# Patient Record
Sex: MALE | Race: WHITE | NOT HISPANIC OR LATINO | Employment: FULL TIME | URBAN - METROPOLITAN AREA
[De-identification: names, ages, dates, MRNs, and addresses within clinical notes are randomized per-mention and may not be internally consistent; named-entity substitution may affect disease eponyms.]

---

## 2017-06-19 ENCOUNTER — OFFICE VISIT (OUTPATIENT)
Dept: PEDIATRICS | Facility: CLINIC | Age: 62
End: 2017-06-19
Payer: COMMERCIAL

## 2017-06-19 VITALS
DIASTOLIC BLOOD PRESSURE: 80 MMHG | BODY MASS INDEX: 30.08 KG/M2 | HEIGHT: 68 IN | HEART RATE: 71 BPM | TEMPERATURE: 96 F | OXYGEN SATURATION: 96 % | WEIGHT: 198.5 LBS | SYSTOLIC BLOOD PRESSURE: 122 MMHG

## 2017-06-19 DIAGNOSIS — J20.9 ACUTE BRONCHITIS, UNSPECIFIED ORGANISM: Primary | ICD-10-CM

## 2017-06-19 PROCEDURE — 99213 OFFICE O/P EST LOW 20 MIN: CPT | Performed by: NURSE PRACTITIONER

## 2017-06-19 RX ORDER — ALBUTEROL SULFATE 90 UG/1
2 AEROSOL, METERED RESPIRATORY (INHALATION) EVERY 4 HOURS PRN
Qty: 1 INHALER | Refills: 1 | Status: SHIPPED | OUTPATIENT
Start: 2017-06-19 | End: 2017-12-26

## 2017-06-19 RX ORDER — METHYLPREDNISOLONE 4 MG
TABLET, DOSE PACK ORAL
Qty: 21 TABLET | Refills: 0 | Status: SHIPPED | OUTPATIENT
Start: 2017-06-19 | End: 2017-12-26

## 2017-06-19 RX ORDER — AZITHROMYCIN 250 MG/1
TABLET, FILM COATED ORAL
Qty: 6 TABLET | Refills: 0 | Status: SHIPPED | OUTPATIENT
Start: 2017-06-19 | End: 2017-12-26

## 2017-06-19 NOTE — PATIENT INSTRUCTIONS
PLAN:   1.   Symptomatic therapy suggested: OTC Robitussin DM and call prn if symptoms persist or worsen.    2.  Orders Placed This Encounter   Medications     methylPREDNISolone (MEDROL DOSEPAK) 4 MG tablet     Sig: Follow package instructions     Dispense:  21 tablet     Refill:  0     azithromycin (ZITHROMAX) 250 MG tablet     Sig: Take 2 tablets the first day and then take one a day for 4 days.     Dispense:  6 tablet     Refill:  0     albuterol (PROAIR HFA/PROVENTIL HFA/VENTOLIN HFA) 108 (90 BASE) MCG/ACT Inhaler     Sig: Inhale 2 puffs into the lungs every 4 hours as needed for shortness of breath / dyspnea or wheezing     Dispense:  1 Inhaler     Refill:  1     Spacer/Aero-Holding Chambers (SPACER/AERO-HOLD CHAMBER MASK) SHIRAZ     Si Device as needed     Dispense:  1 each     Refill:  0     3. Patient needs to follow up in if no improvement,or sooner if worsening of symptoms or other symptoms develop.      It was a pleasure seeing you today at the [facility]. Thank you for allowing us to care for you today. We truly hope we provided you with the excellent service you deserve. Please let us know if there is anything else we can do for you so we can be sure you are leaving completley satisfied with your care experience.       General information about your clinic   Clinic Hours Lab Hours (Appointments are required)   Mon-Thurs: 7:30 AM - 7 PM Mon-Thurs: 7:30 AM - 7 PM   Fri: 7:30 AM - 5 PM Fri: 7:30 AM - 5 PM        After Hours Nurse Advise & Appts:  [facility]: [phone]  [facility] On Call: to make appointments anytime: [phone] On Call Physician: call [phone] and answering service will page the on call physician.        For urgent appointments, please call [phone] and ask for the triage nurse or your care team clinic nurse.  How to contact my care team:  [facility]: [email or web address]   Phone: [phone]   Fax: [phone]       [facility]:   Phone: [phone]  Hours: 8:00 AM - 6:00 PM  Medication Refills:  Call your pharmacy and they will forward the refill to us. Please allow 3 business days for your refills to be completed.       Normal or non-critical lab and imaging results will be communicated to you by MyChart, letter or phone within 7 days.  If you do not hear from us within 10 days, please call the clinic. If you have a critical or abnormal lab result, we will notify you by phone as soon as possible.       We now have PWIC (Pediatric Walk in Care)  Monday-Friday from 7:30-4. Simply walk in and be seen for your urgent needs like cough, fever, rash, diarrhea or vomiting, pink eye, UTI. No appointments needed. Ask one of the team for more information      -Your Care Team:    Dr. Eve Barrow - Internal Medicine/Pediatrics   Dr. Juan R Proctor - Family Medicine  Dr. Ann-Marie Pinedo - Pediatrics  Izabela Moulton CNP - Family Practice Nurse Practitioner  Dr. Meredith Castro - Pediatrics  Dr. Dex Farmer - Internal Medicine            Using an Inhaler with a Spacer  To control asthma, you need to use your medicines the right way. Some medicines are inhaled using a device called a metered-dose inhaler (MDI). Metered-dose inhalers deliver medicine with a fine spray. You may be asked to use a spacer (holding tube) with your inhaler. The spacer helps make sure all the medicine you need goes into your lungs.   Steps for using an inhaler with a spacer  Step 1:    Remove the caps from the inhaler and spacer.    Shake the inhaler well and attach the spacer. If the inhaler is being used for the first time or has not been used for a while, prime it as directed by the product maker.  Step 2:    Breathe out normally.    Put the spacer between your teeth. Close your lips tightly around it.    Keep your chin up.  Step 3:    Spray 1 puff into the spacer by pressing down on the inhaler.    Then breathe in through your mouth as slowly and deeply as you can. This  should take about 5-10 seconds. If you breathe too quickly, you may hear a whistling sound in certain spacers.  Step 4:    Take the spacer out of your mouth.    Hold your breath for a count of 10.    Then hold your lips together and slowly breathe out through your mouth.          If you re prescribed more than 1 puff of medicine at a time, wait at least 30 seconds between puffs. This number may be different for different medicines. Shake the inhaler again. Then repeat steps 2 to 4.   Date Last Reviewed: 10/1/2016    9423-9801 [employer]. [address] [place] [address]. All rights reserved. This information is not intended as a substitute for professional medical care. Always follow your healthcare professional's instructions.

## 2017-06-19 NOTE — NURSING NOTE
"Chief Complaint   Patient presents with     URI     congestion, cough, laryngitis x 1 week       Initial /80 (BP Location: Right arm, Patient Position: Sitting, Cuff Size: Adult Regular)  Pulse 71  Temp 96  F (35.6  C) (Temporal)  Ht 5' 8.25\" (1.734 m)  Wt 198 lb 8 oz (90 kg)  SpO2 96%  BMI 29.96 kg/m2 Estimated body mass index is 29.96 kg/(m^2) as calculated from the following:    Height as of this encounter: 5' 8.25\" (1.734 m).    Weight as of this encounter: 198 lb 8 oz (90 kg).  Medication Reconciliation: complete      XOCHITL Vincent      "

## 2017-06-19 NOTE — PROGRESS NOTES
SUBJECTIVE:                                                    Aamir Wilkins is a 61 year old male who presents to clinic today for the following health issues:    Acute Illness   Acute illness concerns: congestion, cough  Onset: 1 week    Fever: no    Chills/Sweats: no    Headache (location?): no    Sinus Pressure:no    Conjunctivitis:  no    Ear Pain: no    Rhinorrhea: YES    Congestion: YES- chest congestion    Sore Throat: YES- laryngitis      Cough: YES-productive    Wheeze: no    Decreased Appetite: YES    Nausea: no    Vomiting: no    Diarrhea:  YES    Dysuria/Freq.: no    Fatigue/Achiness: YES    Sick/Strep Exposure: YES- son and son's girl friend     Therapies Tried and outcome: mucinex DM, dayquil, nyquil  Started with a cough about a week ago and congestion in his chest  Smoker about a 1 pack a day     Problem list and histories reviewed & adjusted, as indicated.  Additional history: as documented    Patient Active Problem List   Diagnosis     Advanced directives, counseling/discussion     Smoking     Hyperlipidemia LDL goal <130     Obesity (BMI 30-39.9)     Tobacco use disorder     Dyslipidemia     Elevated fasting glucose     Erectile dysfunction, unspecified erectile dysfunction type     Past Surgical History:   Procedure Laterality Date     HERNIA REPAIR      age 17--right     ROTATOR CUFF REPAIR RT/LT  2002    left        Social History   Substance Use Topics     Smoking status: Current Every Day Smoker     Packs/day: 0.50     Years: 25.00     Types: Cigarettes     Smokeless tobacco: Never Used     Alcohol use Yes      Comment: rarely     Family History   Problem Relation Age of Onset     Unknown/Adopted Daughter      Hypertension Mother      Eye Disorder Mother      cataracts, glucoma          Current Outpatient Prescriptions   Medication Sig Dispense Refill     triamcinolone (KENALOG) 0.1 % cream Apply sparingly to affected area three times daily prn (Patient not taking: Reported on  "6/19/2017) 30 g 0     aspirin 81 MG tablet Take 1 tablet by mouth daily. (Patient not taking: Reported on 6/19/2017)  3     No Known Allergies    Reviewed and updated as needed this visit by clinical staff  Tobacco  Allergies  Meds  Med Hx  Surg Hx  Fam Hx  Soc Hx      Reviewed and updated as needed this visit by Provider         ROS:  CONSTITUTIONAL:NEGATIVE for fever, chills, change in weight  INTEGUMENTARY/SKIN: NEGATIVE for rash   ENT/MOUTH: POSITIVE for nasal congestion and postnasal drainage and hoarseness  NEGATIVE for epistaxis and fever  RESP:POSITIVE for cough-productive and NEGATIVE for Hx asthma and Hx COPD  GI: NEGATIVE for nausea, poor appetite and vomiting  MUSCULOSKELETAL: NEGATIVE for significant arthralgias or myalgia  HEME/ALLERGY/IMMUNE: NEGATIVE for bleeding problems    OBJECTIVE:                                                    /80 (BP Location: Right arm, Patient Position: Sitting, Cuff Size: Adult Regular)  Pulse 71  Temp 96  F (35.6  C) (Temporal)  Ht 5' 8.25\" (1.734 m)  Wt 198 lb 8 oz (90 kg)  SpO2 96%  BMI 29.96 kg/m2  Body mass index is 29.96 kg/(m^2).  GENERAL: Patient is well nourished, well developed,in no apparent distress, non-toxic, in no respiratory distress and acyanotic, alert, cooperative and well hydrated  mildly ill but alert and responsive  EYES:  Right conjunctiva is not injected and without discharge.  Left conjunctiva is not injected and without discharge.  EARS: negative findings: external ears normal to inspection and palpation, TM's clear, no mastoid process tenderness,  NOSE: positive findings: mucosa swollen, pale, and boggy,  Sinus not tender.  THROAT: normal.  NECK: supple with no adenopathy,   CARDIAC:NORMAL - regular rate and rhythm without murmur.  RESP: normal respiratory rate and rhythm and moderate expiratory wheezes and rhonchi throughout both lung fields  unlabored respirations, no intercostal retractions or accessory muscle use  ABD: " Abdomen soft, non-tender.  SKIN: Skin color, texture, turgor normal. No rashes or lesions.  MS: extremities normal- no gross deformities noted, gait normal and normal muscle tone    Diagnostic Test Results:  none      ASSESSMENT/PLAN:                                                      Aamir was seen today for uri.    Diagnoses and all orders for this visit:    Acute bronchitis, unspecified organism  -     methylPREDNISolone (MEDROL DOSEPAK) 4 MG tablet; Follow package instructions  -     azithromycin (ZITHROMAX) 250 MG tablet; Take 2 tablets the first day and then take one a day for 4 days.  -     albuterol (PROAIR HFA/PROVENTIL HFA/VENTOLIN HFA) 108 (90 BASE) MCG/ACT Inhaler; Inhale 2 puffs into the lungs every 4 hours as needed for shortness of breath / dyspnea or wheezing  -     Spacer/Aero-Holding Chambers (SPACER/AERO-HOLD CHAMBER MASK) SHIRAZ; 1 Device as needed  PLAN:   1.   Symptomatic therapy suggested: OTC Robitussin DM and call prn if symptoms persist or worsen.    2.  Orders Placed This Encounter   Medications     methylPREDNISolone (MEDROL DOSEPAK) 4 MG tablet     Sig: Follow package instructions     Dispense:  21 tablet     Refill:  0     azithromycin (ZITHROMAX) 250 MG tablet     Sig: Take 2 tablets the first day and then take one a day for 4 days.     Dispense:  6 tablet     Refill:  0     albuterol (PROAIR HFA/PROVENTIL HFA/VENTOLIN HFA) 108 (90 BASE) MCG/ACT Inhaler     Sig: Inhale 2 puffs into the lungs every 4 hours as needed for shortness of breath / dyspnea or wheezing     Dispense:  1 Inhaler     Refill:  1     Spacer/Aero-Holding Chambers (SPACER/AERO-HOLD CHAMBER MASK) SHIRAZ     Si Device as needed     Dispense:  1 each     Refill:  0     3. Patient needs to follow up in if no improvement,or sooner if worsening of symptoms or other symptoms develop.        See Patient Instructions    HAVEN Morgan CNP  [facility]

## 2017-06-19 NOTE — MR AVS SNAPSHOT
After Visit Summary   2017    Aamir Wilkins    MRN: [id number]           Patient Information     Date Of Birth          1955        Visit Information        Provider Department      2017 3:20 PM Izabela Moulton APRN CNP [facility]        Today's Diagnoses     Acute bronchitis, unspecified organism    -  1      Care Instructions    PLAN:   1.   Symptomatic therapy suggested: OTC Robitussin DM and call prn if symptoms persist or worsen.    2.  Orders Placed This Encounter   Medications     methylPREDNISolone (MEDROL DOSEPAK) 4 MG tablet     Sig: Follow package instructions     Dispense:  21 tablet     Refill:  0     azithromycin (ZITHROMAX) 250 MG tablet     Sig: Take 2 tablets the first day and then take one a day for 4 days.     Dispense:  6 tablet     Refill:  0     albuterol (PROAIR HFA/PROVENTIL HFA/VENTOLIN HFA) 108 (90 BASE) MCG/ACT Inhaler     Sig: Inhale 2 puffs into the lungs every 4 hours as needed for shortness of breath / dyspnea or wheezing     Dispense:  1 Inhaler     Refill:  1     Spacer/Aero-Holding Chambers (SPACER/AERO-HOLD CHAMBER MASK) SHIRAZ     Si Device as needed     Dispense:  1 each     Refill:  0     3. Patient needs to follow up in if no improvement,or sooner if worsening of symptoms or other symptoms develop.      It was a pleasure seeing you today at the [facility]. Thank you for allowing us to care for you today. We truly hope we provided you with the excellent service you deserve. Please let us know if there is anything else we can do for you so we can be sure you are leaving completley satisfied with your care experience.       General information about your clinic   Clinic Hours Lab Hours (Appointments are required)   Mon-Thurs: 7:30 AM - 7 PM Mon-Thurs: 7:30 AM - 7 PM   Fri: 7:30 AM - 5 PM Fri: 7:30 AM - 5 PM        After Hours Nurse Advise & Appts:  [facility]:  [phone]  [facility] On Call: to make appointments anytime: [phone] On Call Physician: call [phone] and answering service will page the on call physician.        For urgent appointments, please call [phone] and ask for the triage nurse or your care team clinic nurse.  How to contact my care team:  [facility]: [email or web address]   Phone: [phone]   Fax: [phone]       [facility]:   Phone: [phone]  Hours: 8:00 AM - 6:00 PM  Medication Refills:  Call your pharmacy and they will forward the refill to us. Please allow 3 business days for your refills to be completed.       Normal or non-critical lab and imaging results will be communicated to you by MyChart, letter or phone within 7 days.  If you do not hear from us within 10 days, please call the clinic. If you have a critical or abnormal lab result, we will notify you by phone as soon as possible.       We now have PWIC (Pediatric Walk in Care)  Monday-Friday from 7:30-4. Simply walk in and be seen for your urgent needs like cough, fever, rash, diarrhea or vomiting, pink eye, UTI. No appointments needed. Ask one of the team for more information      -Your Care Team:    Dr. Eve Barrow - Internal Medicine/Pediatrics   Dr. Juan R Proctor - Family Medicine  Dr. Ann-Marie Pinedo - Pediatrics  Izabela Moulton CNP - Family Practice Nurse Practitioner  Dr. Meredith Castro - Pediatrics  Dr. Dex Farmer - Internal Medicine            Using an Inhaler with a Spacer  To control asthma, you need to use your medicines the right way. Some medicines are inhaled using a device called a metered-dose inhaler (MDI). Metered-dose inhalers deliver medicine with a fine spray. You may be asked to use a spacer (holding tube) with your inhaler. The spacer helps make sure all the medicine you need goes into your lungs.   Steps for using an inhaler with a spacer  Step 1:    Remove the caps from the inhaler and spacer.    Shake the inhaler well and attach the  spacer. If the inhaler is being used for the first time or has not been used for a while, prime it as directed by the product maker.  Step 2:    Breathe out normally.    Put the spacer between your teeth. Close your lips tightly around it.    Keep your chin up.  Step 3:    Spray 1 puff into the spacer by pressing down on the inhaler.    Then breathe in through your mouth as slowly and deeply as you can. This should take about 5-10 seconds. If you breathe too quickly, you may hear a whistling sound in certain spacers.  Step 4:    Take the spacer out of your mouth.    Hold your breath for a count of 10.    Then hold your lips together and slowly breathe out through your mouth.          If you re prescribed more than 1 puff of medicine at a time, wait at least 30 seconds between puffs. This number may be different for different medicines. Shake the inhaler again. Then repeat steps 2 to 4.   Date Last Reviewed: 10/1/2016    6175-4164 [employer]. [address] [place] [address]. All rights reserved. This information is not intended as a substitute for professional medical care. Always follow your healthcare professional's instructions.                Follow-ups after your visit        Who to contact     If you have questions or need follow up information about today's clinic visit or your schedule please contact [facility] directly at [phone].  Normal or non-critical lab and imaging results will be communicated to you by MyChart, letter or phone within 4 business days after the clinic has received the results. If you do not hear from us within 7 days, please contact the clinic through MyChart or phone. If you have a critical or abnormal lab result, we will notify you by phone as soon as possible.  Submit refill requests through [facility] or call your pharmacy and they will forward the refill request to us. Please allow 3 business days for your refill to be completed.           "Additional Information About Your Visit        [facility] Information     [facility] gives you secure access to your electronic health record. If you see a primary care provider, you can also send messages to your care team and make appointments. If you have questions, please call your primary care clinic.  If you do not have a primary care provider, please call [phone] and they will assist you.      [facility] is an electronic gateway that provides easy, online access to your medical records. With [facility], you can request a clinic appointment, read your test results, renew a prescription or communicate with your care team.     To access your existing account, please contact your [facility] or call [phone] for assistance.        Care EveryWhere ID     This is your Care EveryWhere ID. This could be used by other organizations to access your [facility] medical records  [id number]        Your Vitals Were     Pulse Temperature Height Pulse Oximetry BMI (Body Mass Index)       71 96  F (35.6  C) (Temporal) 5' 8.25\" (1.734 m) 96% 29.96 kg/m2        Blood Pressure from Last 3 Encounters:   06/19/17 122/80   07/18/16 140/80   11/28/14 138/80    Weight from Last 3 Encounters:   06/19/17 198 lb 8 oz (90 kg)   07/18/16 199 lb 11.2 oz (90.6 kg)   11/28/14 201 lb (91.2 kg)              Today, you had the following     No orders found for display         Today's Medication Changes          These changes are accurate as of: 6/19/17  3:59 PM.  If you have any questions, ask your nurse or doctor.               Start taking these medicines.        Dose/Directions    albuterol 108 (90 BASE) MCG/ACT Inhaler   Commonly known as:  PROAIR HFA/PROVENTIL HFA/VENTOLIN HFA   Used for:  Acute bronchitis, unspecified organism   Started by:  Izabela Moulton APRN CNP        Dose:  2 puff   Inhale 2 puffs into the lungs every 4 hours as needed for shortness of breath / dyspnea or wheezing   Quantity:  1 " Inhaler   Refills:  1       azithromycin 250 MG tablet   Commonly known as:  ZITHROMAX   Used for:  Acute bronchitis, unspecified organism   Started by:  Izabela Moulton APRN CNP        Take 2 tablets the first day and then take one a day for 4 days.   Quantity:  6 tablet   Refills:  0       methylPREDNISolone 4 MG tablet   Commonly known as:  MEDROL DOSEPAK   Used for:  Acute bronchitis, unspecified organism   Started by:  Izabela Moulton APRN CNP        Follow package instructions   Quantity:  21 tablet   Refills:  0       spacer/aero-hold chamber mask Angelica   Used for:  Acute bronchitis, unspecified organism   Started by:  Izabela Moulton APRN CNP        Dose:  1 Device   1 Device as needed   Quantity:  1 each   Refills:  0            Where to get your medicines      These medications were sent to [id number] - [facility], [place] - [address]  [address] [address]     Phone:  [phone]     albuterol 108 (90 BASE) MCG/ACT Inhaler    azithromycin 250 MG tablet    methylPREDNISolone 4 MG tablet    spacer/aero-hold chamber mask Angelica                Primary Care Provider Office Phone # Fax #    Juan R Proctor [phone]       [facility] [id number] [facility] [address]        Thank you!     Thank you for choosing [facility]  for your care. Our goal is always to provide you with excellent care. Hearing back from our patients is one way we can continue to improve our services. Please take a few minutes to complete the written survey that you may receive in the mail after your visit with us. Thank you!             Your Updated Medication List - Protect others around you: Learn how to safely use, store and throw away your medicines at www.disposemymeds.org.          This list is accurate as of: 6/19/17  3:59 PM.  Always use your most recent med list.                   Brand Name Dispense Instructions for use     albuterol 108 (90 BASE) MCG/ACT Inhaler    PROAIR HFA/PROVENTIL HFA/VENTOLIN HFA    1 Inhaler    Inhale 2 puffs into the lungs every 4 hours as needed for shortness of breath / dyspnea or wheezing       aspirin 81 MG tablet      Take 1 tablet by mouth daily.       azithromycin 250 MG tablet    ZITHROMAX    6 tablet    Take 2 tablets the first day and then take one a day for 4 days.       methylPREDNISolone 4 MG tablet    MEDROL DOSEPAK    21 tablet    Follow package instructions       spacer/aero-hold chamber mask Angelica     1 each    1 Device as needed       triamcinolone 0.1 % cream    KENALOG    30 g    Apply sparingly to affected area three times daily prn

## 2017-12-26 ENCOUNTER — TELEPHONE (OUTPATIENT)
Dept: PEDIATRICS | Facility: CLINIC | Age: 62
End: 2017-12-26

## 2017-12-26 ENCOUNTER — OFFICE VISIT (OUTPATIENT)
Dept: PEDIATRICS | Facility: CLINIC | Age: 62
End: 2017-12-26
Payer: COMMERCIAL

## 2017-12-26 VITALS
HEART RATE: 80 BPM | TEMPERATURE: 97.6 F | OXYGEN SATURATION: 97 % | SYSTOLIC BLOOD PRESSURE: 126 MMHG | BODY MASS INDEX: 31.27 KG/M2 | DIASTOLIC BLOOD PRESSURE: 72 MMHG | WEIGHT: 207.2 LBS

## 2017-12-26 DIAGNOSIS — F17.200 TOBACCO USE DISORDER: ICD-10-CM

## 2017-12-26 DIAGNOSIS — E55.9 VITAMIN D INSUFFICIENCY: ICD-10-CM

## 2017-12-26 DIAGNOSIS — R25.2 MUSCLE CRAMP, NOCTURNAL: Primary | ICD-10-CM

## 2017-12-26 LAB
ALBUMIN SERPL-MCNC: 4 G/DL (ref 3.4–5)
ALP SERPL-CCNC: 59 U/L (ref 40–150)
ALT SERPL W P-5'-P-CCNC: 54 U/L (ref 0–70)
ANION GAP SERPL CALCULATED.3IONS-SCNC: 5 MMOL/L (ref 3–14)
AST SERPL W P-5'-P-CCNC: 36 U/L (ref 0–45)
BASOPHILS # BLD AUTO: 0.1 10E9/L (ref 0–0.2)
BASOPHILS NFR BLD AUTO: 0.8 %
BILIRUB SERPL-MCNC: 0.3 MG/DL (ref 0.2–1.3)
BUN SERPL-MCNC: 14 MG/DL (ref 7–30)
CALCIUM SERPL-MCNC: 8.8 MG/DL (ref 8.5–10.1)
CHLORIDE SERPL-SCNC: 107 MMOL/L (ref 94–109)
CO2 SERPL-SCNC: 25 MMOL/L (ref 20–32)
CREAT SERPL-MCNC: 0.84 MG/DL (ref 0.66–1.25)
CRP SERPL-MCNC: 5.4 MG/L (ref 0–8)
DEPRECATED CALCIDIOL+CALCIFEROL SERPL-MC: 16 UG/L (ref 20–75)
DIFFERENTIAL METHOD BLD: NORMAL
EOSINOPHIL # BLD AUTO: 0.5 10E9/L (ref 0–0.7)
EOSINOPHIL NFR BLD AUTO: 4.5 %
ERYTHROCYTE [DISTWIDTH] IN BLOOD BY AUTOMATED COUNT: 12 % (ref 10–15)
ERYTHROCYTE [SEDIMENTATION RATE] IN BLOOD BY WESTERGREN METHOD: 5 MM/H (ref 0–20)
FERRITIN SERPL-MCNC: 331 NG/ML (ref 26–388)
GFR SERPL CREATININE-BSD FRML MDRD: >90 ML/MIN/1.7M2
GLUCOSE SERPL-MCNC: 89 MG/DL (ref 70–99)
HBA1C MFR BLD: 5.4 % (ref 4.3–6)
HCT VFR BLD AUTO: 46.1 % (ref 40–53)
HGB BLD-MCNC: 15.9 G/DL (ref 13.3–17.7)
IMM GRANULOCYTES # BLD: 0 10E9/L (ref 0–0.4)
IMM GRANULOCYTES NFR BLD: 0.4 %
LYMPHOCYTES # BLD AUTO: 2.9 10E9/L (ref 0.8–5.3)
LYMPHOCYTES NFR BLD AUTO: 28 %
MAGNESIUM SERPL-MCNC: 2.4 MG/DL (ref 1.6–2.3)
MCH RBC QN AUTO: 32 PG (ref 26.5–33)
MCHC RBC AUTO-ENTMCNC: 34.5 G/DL (ref 31.5–36.5)
MCV RBC AUTO: 93 FL (ref 78–100)
MONOCYTES # BLD AUTO: 0.8 10E9/L (ref 0–1.3)
MONOCYTES NFR BLD AUTO: 7.6 %
NEUTROPHILS # BLD AUTO: 6.1 10E9/L (ref 1.6–8.3)
NEUTROPHILS NFR BLD AUTO: 58.7 %
PLATELET # BLD AUTO: 287 10E9/L (ref 150–450)
POTASSIUM SERPL-SCNC: 4.5 MMOL/L (ref 3.4–5.3)
PROT SERPL-MCNC: 7.6 G/DL (ref 6.8–8.8)
RBC # BLD AUTO: 4.97 10E12/L (ref 4.4–5.9)
RHEUMATOID FACT SER NEPH-ACNC: <20 IU/ML (ref 0–20)
SODIUM SERPL-SCNC: 137 MMOL/L (ref 133–144)
TSH SERPL DL<=0.005 MIU/L-ACNC: 1.68 MU/L (ref 0.4–4)
VIT B12 SERPL-MCNC: 389 PG/ML (ref 193–986)
WBC # BLD AUTO: 10.4 10E9/L (ref 4–11)

## 2017-12-26 PROCEDURE — 82306 VITAMIN D 25 HYDROXY: CPT | Performed by: FAMILY MEDICINE

## 2017-12-26 PROCEDURE — 80050 GENERAL HEALTH PANEL: CPT | Performed by: FAMILY MEDICINE

## 2017-12-26 PROCEDURE — 83921 ORGANIC ACID SINGLE QUANT: CPT | Mod: 90 | Performed by: FAMILY MEDICINE

## 2017-12-26 PROCEDURE — 83735 ASSAY OF MAGNESIUM: CPT | Performed by: FAMILY MEDICINE

## 2017-12-26 PROCEDURE — 99214 OFFICE O/P EST MOD 30 MIN: CPT | Performed by: FAMILY MEDICINE

## 2017-12-26 PROCEDURE — 86140 C-REACTIVE PROTEIN: CPT | Performed by: FAMILY MEDICINE

## 2017-12-26 PROCEDURE — 86431 RHEUMATOID FACTOR QUANT: CPT | Performed by: FAMILY MEDICINE

## 2017-12-26 PROCEDURE — 36415 COLL VENOUS BLD VENIPUNCTURE: CPT | Performed by: FAMILY MEDICINE

## 2017-12-26 PROCEDURE — 82607 VITAMIN B-12: CPT | Performed by: FAMILY MEDICINE

## 2017-12-26 PROCEDURE — 99000 SPECIMEN HANDLING OFFICE-LAB: CPT | Performed by: FAMILY MEDICINE

## 2017-12-26 PROCEDURE — 82728 ASSAY OF FERRITIN: CPT | Performed by: FAMILY MEDICINE

## 2017-12-26 PROCEDURE — 83036 HEMOGLOBIN GLYCOSYLATED A1C: CPT | Performed by: FAMILY MEDICINE

## 2017-12-26 PROCEDURE — 85652 RBC SED RATE AUTOMATED: CPT | Performed by: FAMILY MEDICINE

## 2017-12-26 ASSESSMENT — PAIN SCALES - GENERAL: PAINLEVEL: NO PAIN (0)

## 2017-12-26 NOTE — PATIENT INSTRUCTIONS
Get the labs today    Reset my chart by calling [phone] for [employer] access assistance    [employer] Login ID: [email or web address]

## 2017-12-26 NOTE — MR AVS SNAPSHOT
After Visit Summary   12/26/2017    Aamir Wilkins    MRN: [id number]           Patient Information     Date Of Birth          1955        Visit Information        Provider Department      12/26/2017 10:10 AM Juan R Proctor MD [facility]        Today's Diagnoses     Muscle cramp, nocturnal    -  1    Tobacco use disorder          Care Instructions    Get the labs today    Reset my chart by calling [phone] for [employer] access assistance    Spotie Login ID: [id number]                 Follow-ups after your visit        Who to contact     If you have questions or need follow up information about today's clinic visit or your schedule please contact [facility] [[email or web address]] directly at [phone].  Normal or non-critical lab and imaging results will be communicated to you by [employer], letter or phone within 4 business days after the clinic has received the results. If you do not hear from us within 7 days, please contact the clinic through Spotie or phone. If you have a critical or abnormal lab result, we will notify you by phone as soon as possible.  Submit refill requests through Spotie or call your pharmacy and they will forward the refill request to us. Please allow 3 business days for your refill to be completed.          Additional Information About Your Visit        Spotie Information     Spotie gives you secure access to your electronic health record. If you see a primary care provider, you can also send messages to your care team and make appointments. If you have questions, please call your primary care clinic.  If you do not have a primary care provider, please call [phone] and they will assist you.      Spotie is an electronic gateway that provides easy, online access to your medical records. With Spotie, you can request a clinic appointment, read your test results, renew a prescription or communicate with your care team.     To access your  existing account, please contact your [facility] or call [phone] for assistance.        Care EveryWhere ID     This is your Care EveryWhere ID. This could be used by other organizations to access your [place] medical records  [id number]        Your Vitals Were     Pulse Temperature Pulse Oximetry BMI (Body Mass Index)          80 97.6  F (36.4  C) (Oral) 97% 31.27 kg/m2         Blood Pressure from Last 3 Encounters:   12/26/17 126/72   06/19/17 122/80   07/18/16 140/80    Weight from Last 3 Encounters:   12/26/17 207 lb 3.2 oz (94 kg)   06/19/17 198 lb 8 oz (90 kg)   07/18/16 199 lb 11.2 oz (90.6 kg)              We Performed the Following     CBC with platelets and differential     Comprehensive metabolic panel     CRP, inflammation     ESR: Erythrocyte sedimentation rate     Ferritin     Hemoglobin A1c     Magnesium     Methylmalonic acid     Rheumatoid factor     TSH with free T4 reflex     Vitamin B12     Vitamin D Deficiency        Primary Care Provider Office Phone # Fax #    Juan R Proctor [phone] [address] [facility] [address]        Equal Access to Services     [facility]: Hadii aad ku hadasho Sopietroali, waaxda luqadaha, qaybta kaalmada adeligiayayajaira, patricia harrison . So [facility] [phone].    ATENCIÓN: Si habla español, tiene a mendoza disposición servicios gratuitos de asistencia lingüística. Llame al [phone].    We comply with applicable federal civil rights laws and [place] laws. We do not discriminate on the basis of race, color, national origin, age, disability, sex, sexual orientation, or gender identity.            Thank you!     Thank you for choosing [facility]  for your care. Our goal is always to provide you with excellent care. Hearing back from our patients is one way we can continue to improve our services. Please take a few minutes to complete the written survey that you  may receive in the mail after your visit with us. Thank you!             Your Updated Medication List - Protect others around you: Learn how to safely use, store and throw away your medicines at www.disposemymeds.org.      Notice  As of 12/26/2017 10:40 AM    You have not been prescribed any medications.

## 2017-12-26 NOTE — TELEPHONE ENCOUNTER
I am not sure if patient is able to see the results yet.  Please call him at work as he requested with lab results/recommendations      Notes Recorded by Juan R Proctor MD on 12/26/2017 at 4:06 PM  Chapin Rutledge,  The test results form today showed normal hemoglobin, electrolytes, negative inflammation markers,normal thyroid, liver and kidney functions and normal diabetes screen( A1C).  These are reassuring.  As we discussed, given that the labs are normal,I would recommend to consult sleep medicine and get a test for assessing circulation in your legs.  I would ask our clinic scheduler to help you schedule with these.   Let me know if you have any questions. Take care.  Juan R Proctor MD

## 2017-12-26 NOTE — NURSING NOTE
"Chief Complaint   Patient presents with     Musculoskeletal Problem       Initial /72 (BP Location: Right arm, Patient Position: Sitting, Cuff Size: Adult Large)  Pulse 80  Temp 97.6  F (36.4  C) (Oral)  Wt 207 lb 3.2 oz (94 kg)  SpO2 97%  BMI 31.27 kg/m2 Estimated body mass index is 31.27 kg/(m^2) as calculated from the following:    Height as of 6/19/17: 5' 8.25\" (1.734 m).    Weight as of this encounter: 207 lb 3.2 oz (94 kg).  BP completed using cuff size: sharla Petersen, CMA    "

## 2017-12-26 NOTE — PROGRESS NOTES
Chapin Rutledge,  The test results form today showed normal hemoglobin, electrolytes, negative inflammation markers,normal thyroid, liver and kidney functions and normal diabetes screen( A1C).  These are reassuring.  As we discussed, given that the labs are normal,I would recommend to consult sleep medicine and get a test for assessing circulation in your legs.  I would ask our clinic scheduler to help you schedule with these.   Let me know if you have any questions. Take care.  Juan R Proctor MD

## 2017-12-26 NOTE — PROGRESS NOTES
SUBJECTIVE:   Aamir Wilkins is a 62 year old male who presents to clinic today for the following health issues:      Muscle spasms/cramping    Patient with past medical history significant for chronic smoking, daily alcohol use, dyslipidemia, elevated fasting blood sugar, Obesity here with concerns of having progressively worsening muscle cramping in the lower legs, especially over the calf muscles Almost on a daily basis for the past 6 months.   Patient denies concerns for low back pain, tingling, numbness or weakness of the extremities, bladder or bowel symptoms.  She denies underlying history of diabetes, thyroid disorder abnormal bleeding, anemia.  Patient has been smoking 1 pack per day for the past 25+ years.   He has been drinking 2-3 beers daily for many years now.  Patient denies concerns for snoring or sleep apnea, but he feels he has interrupted sleep pattern due to need to wake up at 3 AM for his work.  Patient denies use of recreational drugs.  He denies recent weight loss, weight gain, abnormal skin rashes.  Patient hasn't had a physical in the past 3 years, he was being seen for acute care visit.  Patient has been using over-the-counter ibuprofen and water to hydrate himself when he wakes up at night for muscle cramping.  He denies chronic use of PPI    Patient denies concerns for Claudication pain while walking,And actually noted improvement of pain and cramping at night when he tries to walk.  Mostly his cramping happens at night when he tries to sleep, denies concerns for restless legs.      Duration: 6-12 months    Description (location/character/radiation): Patient c/o frequent muscle cramping and spasms in lower legs and other areas of body as well    Intensity:  moderate    Accompanying signs and symptoms: Disrupted sleep due to waking and having to walk around to get the muscle cramps to calm down     History (similar episodes/previous evaluation): Episode of severe muscle cramp that  caused ED visit in past    Precipitating or alleviating factors: None    Therapies tried and outcome: Ibuprofen, increased fluids             Problem list and histories reviewed & adjusted, as indicated.  Additional history: as documented    Patient Active Problem List   Diagnosis     Advanced directives, counseling/discussion     Smoking     Hyperlipidemia LDL goal <130     Obesity (BMI 30-39.9)     Tobacco use disorder     Dyslipidemia     Elevated fasting glucose     Erectile dysfunction, unspecified erectile dysfunction type     Past Surgical History:   Procedure Laterality Date     HERNIA REPAIR      age 17--right     ROTATOR CUFF REPAIR RT/LT  2002    left        Social History   Substance Use Topics     Smoking status: Current Every Day Smoker     Packs/day: 0.50     Years: 25.00     Types: Cigarettes     Smokeless tobacco: Never Used     Alcohol use Yes      Comment: rarely     Family History   Problem Relation Age of Onset     Unknown/Adopted Daughter      Hypertension Mother      Eye Disorder Mother      cataracts, glucoma          No current outpatient prescriptions on file.     No Known Allergies  Recent Labs   Lab Test  12/26/17   1042  01/24/14   0715  10/01/13   0853  07/06/12   1034   A1C  5.4  5.6   --    --    LDL   --    --   99  129   HDL   --    --   39*  39*   TRIG   --    --   197*  248*   ALT  54   --    --    --    CR  0.84   --    --   0.74   GFRESTIMATED  >90   --    --   >90   GFRESTBLACK  >90   --    --   >90   POTASSIUM  4.5   --    --   4.8   TSH  1.68   --    --    --       BP Readings from Last 3 Encounters:   12/26/17 126/72   06/19/17 122/80   07/18/16 140/80    Wt Readings from Last 3 Encounters:   12/26/17 207 lb 3.2 oz (94 kg)   06/19/17 198 lb 8 oz (90 kg)   07/18/16 199 lb 11.2 oz (90.6 kg)                  Labs reviewed in EPIC          Reviewed and updated as needed this visit by clinical staffTobacco  Allergies  Meds  Med Hx  Surg Hx  Fam Hx  Soc Hx      Reviewed and  updated as needed this visit by Provider         ROS:  C: NEGATIVE for fever, chills, change in weight  INTEGUMENTARY/SKIN: NEGATIVE for worrisome rashes, moles or lesions  R: NEGATIVE for significant cough or SOB  CV: NEGATIVE for chest pain, palpitations or peripheral edema  GI: NEGATIVE for nausea, abdominal pain, heartburn, or change in bowel habits  : negative for dysuria, hematuria, decreased urinary stream, erectile dysfunction  MUSCULOSKELETAL: as above  NEURO: NEGATIVE for weakness, dizziness or paresthesias  ENDOCRINE: NEGATIVE for temperature intolerance, skin/hair changes  HEME/ALLERGY/IMMUNE: NEGATIVE for bleeding problems  PSYCHIATRIC: NEGATIVE for changes in mood or affect    OBJECTIVE:     /72 (BP Location: Right arm, Patient Position: Sitting, Cuff Size: Adult Large)  Pulse 80  Temp 97.6  F (36.4  C) (Oral)  Wt 207 lb 3.2 oz (94 kg)  SpO2 97%  BMI 31.27 kg/m2  Body mass index is 31.27 kg/(m^2).  GENERAL: healthy, alert and no distress  NECK: no adenopathy, no asymmetry, masses, or scars and thyroid normal to palpation  RESP: lungs clear to auscultation - no rales, rhonchi or wheezes  CV: regular rate and rhythm, normal S1 S2, no S3 or S4, no murmur, click or rub, no peripheral edema and peripheral pulses strong  MS: no gross musculoskeletal defects noted, no edema  MS: Normal gait, nontender calf muscles, Negative Homans sign, no varicose veins  SKIN: no suspicious lesions or rashes  NEURO: Normal strength and tone, mentation intact and speech normal  BACK: no CVA tenderness, no paralumbar tenderness  Comprehensive back pain exam:  No tenderness, Range of motion not limited by pain, Lower extremity strength functional and equal on both sides, Lower extremity reflexes within normal limits bilaterally, Lower extremity sensation normal and equal on both sides and Straight leg raise negative bilaterally  PSYCH: mentation appears normal, affect normal/bright  Diabetic foot exam: normal DP  and PT pulses, no trophic changes or ulcerative lesions and normal sensory exam    Diagnostic Test Results:  none     ASSESSMENT/PLAN:             1. Muscle cramp, nocturnal  ddx-Check for inflammation/vitamin deficiency/thyroid disorder/ow ferritin/anemia/metabolic/Impaired circulation in the lower legs Given the chronic history of smoking/lumbar radiculopathy  Reviewed the  differential diagnoses with patient,   recommended to have labs done for initial evaluation.   If all lab results are normal, will consider sleep consult /AKSHAT for further evaluation to rule out sleep-related leg movement disorder and PAD.  Patient verbalised understanding and is agreeable to the plan.    - CBC with platelets and differential  - Comprehensive metabolic panel  - TSH with free T4 reflex  - Vitamin D Deficiency  - Hemoglobin A1c  - Vitamin B12  - Methylmalonic acid  - Magnesium  - CRP, inflammation  - ESR: Erythrocyte sedimentation rate  - Rheumatoid factor  - Ferritin    2. Tobacco use disorder  Emphasised on quitting smoking, patient is not determined to quit yet.      Chart documentation done in part with Dragon Voice recognition Software. Although reviewed after completion, some word and grammatical error may remain.    See Patient Instructions    Juan R Proctor MD  [facility]

## 2017-12-27 NOTE — TELEPHONE ENCOUNTER
Patient has read [employer] results.  Left voicemail with Sleep Clinic number and imaging scheduling number, and that he should expect calls from their scheduling as well.    Yahaira Sanchez  Primary Care   [facility] [place]

## 2017-12-28 LAB — METHYLMALONATE SERPL-SCNC: 0.19 UMOL/L (ref 0–0.4)

## 2017-12-29 RX ORDER — ERGOCALCIFEROL 1.25 MG/1
50000 CAPSULE, LIQUID FILLED ORAL
Qty: 8 CAPSULE | Refills: 0 | Status: SHIPPED | OUTPATIENT
Start: 2017-12-29 | End: 2018-02-17

## 2017-12-29 NOTE — PROGRESS NOTES
Dr.Kuppa Terry  Is out of the office and we are reviewing your results for you.  Please follow up if further concerns or questions   Attached are your test results.  Vitamin D level is low and oral supplementation should be started.  ADVISE: start script for Vitamin D once a week for 8 weeks and then Vitamin D3 over the counter 2000 IU daily to maintain levels. I will send the script in for you.   In 2 months, please schedule a lab only appointment to recheck Vitamin D levels (Vit D deficiency, DX: Vitamin D Deficiency)     Please contact us if you have any questions.    Izabela Moulton, CNP

## 2018-01-10 ENCOUNTER — RADIANT APPOINTMENT (OUTPATIENT)
Dept: ULTRASOUND IMAGING | Facility: CLINIC | Age: 63
End: 2018-01-10
Attending: FAMILY MEDICINE
Payer: COMMERCIAL

## 2018-01-10 DIAGNOSIS — R25.2 MUSCLE CRAMP, NOCTURNAL: ICD-10-CM

## 2018-01-10 DIAGNOSIS — F17.200 TOBACCO USE DISORDER: ICD-10-CM

## 2018-01-10 PROCEDURE — 93922 UPR/L XTREMITY ART 2 LEVELS: CPT | Performed by: RADIOLOGY

## 2018-01-12 NOTE — PROGRESS NOTES
Chapin Rutledge,  Your test results are normal and reassuring which indicates that the concerns for possible reduced blood flow in your legs causing the cramping is less likely.  Hopefully with replacing the vitamin D , your symptoms will improve.  We will make sure to recheck the labs at the time of your physical in 3-4 months.   Let me know if you have any questions. Take care.  Juan R Proctor MD

## 2018-01-17 ENCOUNTER — OFFICE VISIT (OUTPATIENT)
Dept: SLEEP MEDICINE | Facility: CLINIC | Age: 63
End: 2018-01-17
Payer: COMMERCIAL

## 2018-01-17 ENCOUNTER — OFFICE VISIT (OUTPATIENT)
Dept: SLEEP MEDICINE | Facility: CLINIC | Age: 63
End: 2018-01-17
Attending: FAMILY MEDICINE
Payer: COMMERCIAL

## 2018-01-17 VITALS
DIASTOLIC BLOOD PRESSURE: 87 MMHG | OXYGEN SATURATION: 96 % | BODY MASS INDEX: 30.84 KG/M2 | SYSTOLIC BLOOD PRESSURE: 132 MMHG | WEIGHT: 208.2 LBS | HEART RATE: 78 BPM | HEIGHT: 69 IN

## 2018-01-17 DIAGNOSIS — R25.2 MUSCLE CRAMP, NOCTURNAL: Primary | ICD-10-CM

## 2018-01-17 DIAGNOSIS — R06.00 DYSPNEA AND RESPIRATORY ABNORMALITY: ICD-10-CM

## 2018-01-17 DIAGNOSIS — E66.9 OBESITY (BMI 30-39.9): Chronic | ICD-10-CM

## 2018-01-17 DIAGNOSIS — E55.9 VITAMIN D DEFICIENCY: ICD-10-CM

## 2018-01-17 DIAGNOSIS — G47.34 NOCTURNAL HYPOXEMIA: ICD-10-CM

## 2018-01-17 DIAGNOSIS — R06.89 DYSPNEA AND RESPIRATORY ABNORMALITY: ICD-10-CM

## 2018-01-17 PROCEDURE — 99243 OFF/OP CNSLTJ NEW/EST LOW 30: CPT | Performed by: INTERNAL MEDICINE

## 2018-01-17 NOTE — PATIENT INSTRUCTIONS

## 2018-01-17 NOTE — NURSING NOTE
"Chief Complaint   Patient presents with     Sleep Problem     Consult - Leg cramps - unable to sleep - usually calf in rt leg - also - sometimes both legs - ham string area - have had muscle pain after reacting or twisting on the side of ribs and lower back and shoulder blade area.       Initial /87  Pulse 78  Ht 1.753 m (5' 9\")  Wt 94.4 kg (208 lb 3.2 oz)  SpO2 96%  BMI 30.75 kg/m2 Estimated body mass index is 30.75 kg/(m^2) as calculated from the following:    Height as of this encounter: 1.753 m (5' 9\").    Weight as of this encounter: 94.4 kg (208 lb 3.2 oz).  Medication Reconciliation: complete   Neck Cir (cm): 44 cm (1/17/2018  9:47 AM) 17\"  ESS 4    Krista James CMA        "

## 2018-01-17 NOTE — PROGRESS NOTES
Patient picked up overnight oximetry and was instructed on use. They showed understanding by demonstrating it back.

## 2018-01-17 NOTE — MR AVS SNAPSHOT
After Visit Summary   1/17/2018    Aamir Wilkins    MRN: [id number]           Patient Information     Date Of Birth          1955        Visit Information        Provider Department      1/17/2018 9:40 AM Stanley Camacho MD [facility]        Today's Diagnoses     Muscle cramp, nocturnal    -  1    Vitamin D deficiency        Obesity (BMI 30-39.9)          Care Instructions      Your BMI is Body mass index is 30.75 kg/(m^2).  Weight management is a personal decision.  If you are interested in exploring weight loss strategies, the following discussion covers the approaches that may be successful. Body mass index (BMI) is one way to tell whether you are at a healthy weight, overweight, or obese. It measures your weight in relation to your height.  A BMI of 18.5 to 24.9 is in the healthy range. A person with a BMI of 25 to 29.9 is considered overweight, and someone with a BMI of 30 or greater is considered obese. More than two-thirds of American adults are considered overweight or obese.  Being overweight or obese increases the risk for further weight gain. Excess weight may lead to heart disease and diabetes.  Creating and following plans for healthy eating and physical activity may help you improve your health.  Weight control is part of healthy lifestyle and includes exercise, emotional health, and healthy eating habits. Careful eating habits lifelong are the mainstay of weight control. Though there are significant health benefits from weight loss, long-term weight loss with diet alone may be very difficult to achieve- studies show long-term success with dietary management in less than 10% of people. Attaining a healthy weight may be especially difficult to achieve in those with severe obesity. In some cases, medications, devices and surgical management might be considered.  What can you do?  If you are overweight or obese and are interested in methods for weight loss, you should  discuss this with your provider.     Consider reducing daily calorie intake by 500 calories.     Keep a food journal.     Avoiding skipping meals, consider cutting portions instead.    Diet combined with exercise helps maintain muscle while optimizing fat loss. Strength training is particularly important for building and maintaining muscle mass. Exercise helps reduce stress, increase energy, and improves fitness. Increasing exercise without diet control, however, may not burn enough calories to loose weight.       Start walking three days a week 10-20 minutes at a time    Work towards walking thirty minutes five days a week     Eventually, increase the speed of your walking for 1-2 minutes at time    In addition, we recommend that you review healthy lifestyles and methods for weight loss available through the National Institutes of Health patient information sites:  http://win.niddk.nih.gov/publications/index.htm    And look into health and wellness programs that may be available through your health insurance provider, employer, local community center, or francie club.    Weight management plan: Patient was referred to their PCP to discuss a diet and exercise plan.              Follow-ups after your visit        Follow-up notes from your care team     Return if symptoms worsen or fail to improve.      Your next 10 appointments already scheduled     Jan 17, 2018 11:00 AM CST   Oximetry  with BETTY BED 5   [facility]    [address] [place]  [place] [address]   [phone]            Jan 18, 2018  7:30 AM CST   Oximetry Drop Off with BETTY SC DME   [facility]    [address] [place]  [place] [address]   [phone]              Future tests that were ordered for you today     Open Future Orders        Priority Expected Expires Ordered    Overnight oximetry study  "Routine  7/16/2018 1/17/2018            Who to contact     If you have questions or need follow up information about today's clinic visit or your schedule please contact [facility] directly at [phone].  Normal or non-critical lab and imaging results will be communicated to you by MyChart, letter or phone within 4 business days after the clinic has received the results. If you do not hear from us within 7 days, please contact the clinic through Event[employer] or phone. If you have a critical or abnormal lab result, we will notify you by phone as soon as possible.  Submit refill requests through [employer] or call your pharmacy and they will forward the refill request to us. Please allow 3 business days for your refill to be completed.          Additional Information About Your Visit        Event[employer]arharGatheredtable Information     [employer] gives you secure access to your electronic health record. If you see a primary care provider, you can also send messages to your care team and make appointments. If you have questions, please call your primary care clinic.  If you do not have a primary care provider, please call [phone] and they will assist you.        Care EveryWhere ID     This is your Care EveryWhere ID. This could be used by other organizations to access your [place] medical records  [id number]        Your Vitals Were     Pulse Height Pulse Oximetry BMI (Body Mass Index)          78 1.753 m (5' 9\") 96% 30.75 kg/m2         Blood Pressure from Last 3 Encounters:   01/17/18 132/87   12/26/17 126/72   06/19/17 122/80    Weight from Last 3 Encounters:   01/17/18 94.4 kg (208 lb 3.2 oz)   12/26/17 94 kg (207 lb 3.2 oz)   06/19/17 90 kg (198 lb 8 oz)              We Performed the Following     SLEEP EVALUATION & MANAGEMENT REFERRAL - ADULT -[facility] [place]  [phone] (Age 15 and up)        Primary Care Provider Office Phone # Fax #    Juan R Proctor [phone]       " [address]  [facility] [address]        Equal Access to Services     PATTIARUNA DANNY : Hadii ankush nugent hadluis fkia Sopietroali, wamarcelda luqadaha, qashaileshta kasuadda ricardo, waxay idiin haygetachewmitchel elizaldetomigianna hill. So [facility] [phone].    ATENCIÓN: Si habla español, tiene a mendoza disposición servicios gratuitos de asistencia lingüística. Llame al [phone].    We comply with applicable federal civil rights laws and [place] laws. We do not discriminate on the basis of race, color, national origin, age, disability, sex, sexual orientation, or gender identity.            Thank you!     Thank you for choosing [facility]  for your care. Our goal is always to provide you with excellent care. Hearing back from our patients is one way we can continue to improve our services. Please take a few minutes to complete the written survey that you may receive in the mail after your visit with us. Thank you!             Your Updated Medication List - Protect others around you: Learn how to safely use, store and throw away your medicines at www.disposemymeds.org.          This list is accurate as of: 1/17/18 10:37 AM.  Always use your most recent med list.                   Brand Name Dispense Instructions for use Diagnosis    IBUPROFEN PO      Take by mouth as needed for moderate pain        vitamin D 57427 UNIT capsule    ERGOCALCIFEROL    8 capsule    Take 1 capsule (50,000 Units) by mouth every 7 days for 8 doses    Vitamin D insufficiency

## 2018-01-17 NOTE — MR AVS SNAPSHOT
After Visit Summary   1/17/2018    Aamir Wilkins    MRN: [id number]           Patient Information     Date Of Birth          1955        Visit Information        Provider Department      1/17/2018 11:00 AM BK BED 5 [facility]        Today's Diagnoses     Muscle cramp, nocturnal           Follow-ups after your visit        Your next 10 appointments already scheduled     Jan 17, 2018 11:00 AM CST   Oximetry  with BK BED 5   [facility] ([facility])    [address]  [facility] [id number] [phone]            Jan 18, 2018  7:30 AM CST   Oximetry Drop Off with BK SC DME   [facility] ([facility])    [address]  [facility] [id number] [phone]              Who to contact     If you have questions or need follow up information about today's clinic visit or your schedule please contact [facility] directly at [phone].  Normal or non-critical lab and imaging results will be communicated to you by [employer], letter or phone within 4 business days after the clinic has received the results. If you do not hear from us within 7 days, please contact the clinic through [employer] or phone. If you have a critical or abnormal lab result, we will notify you by phone as soon as possible.  Submit refill requests through [employer] or call your pharmacy and they will forward the refill request to us. Please allow 3 business days for your refill to be completed.          Additional Information About Your Visit        [employer] Information     [employer] gives you secure access to your electronic health record. If you see a primary care provider, you can also send messages to your care team and make appointments. If you have questions, please call your primary care clinic.  If you do not have a primary care provider, please call [phone]  and they will assist you.        Care EveryWhere ID     This is your Care EveryWhere ID. This could be used by other organizations to access your [place] medical records  [id number]         Blood Pressure from Last 3 Encounters:   01/17/18 132/87   12/26/17 126/72   06/19/17 122/80    Weight from Last 3 Encounters:   01/17/18 94.4 kg (208 lb 3.2 oz)   12/26/17 94 kg (207 lb 3.2 oz)   06/19/17 90 kg (198 lb 8 oz)              We Performed the Following     Overnight oximetry study        Primary Care Provider Office Phone # Fax #    Juan R Proctor [phone] [address]  [facility] [place] [facility] [address]        Equal Access to Services     HARRIS DELGADO : Hadii ankush santamariao Somalka, waaxda luqadaha, qaybta kaalmada adeegyada, patricia harrison . So [facility] [phone].    ATENCIÓN: Si habla español, tiene a mendoza disposición servicios gratuitos de asistencia lingüística. [facility] [phone].    We comply with applicable federal civil rights laws and [place] laws. We do not discriminate on the basis of race, color, national origin, age, disability, sex, sexual orientation, or gender identity.            Thank you!     Thank you for choosing [facility]  for your care. Our goal is always to provide you with excellent care. Hearing back from our patients is one way we can continue to improve our services. Please take a few minutes to complete the written survey that you may receive in the mail after your visit with us. Thank you!             Your Updated Medication List - Protect others around you: Learn how to safely use, store and throw away your medicines at www.disposemymeds.org.          This list is accurate as of: 1/17/18 10:50 AM.  Always use your most recent med list.                   Brand Name Dispense Instructions for use Diagnosis    IBUPROFEN PO      Take by mouth as needed for moderate pain        vitamin D 57561 UNIT capsule    ERGOCALCIFEROL     8 capsule    Take 1 capsule (50,000 Units) by mouth every 7 days for 8 doses    Vitamin D insufficiency

## 2018-01-17 NOTE — PROGRESS NOTES
Sleep Consultation:    Date on this visit: 1/17/2018    Aamir Wilkins is sent by Juan R Proctor for a sleep consultation regarding leg cramps.    Primary Physician: Juan R Proctor     Chief Complaint   Patient presents with     Sleep Problem     Consult - Leg cramps - unable to sleep - usually calf in rt leg - also - sometimes both legs - ham string area - have had muscle pain after reacting or twisting on the side of ribs and lower back and shoulder blade area.     Patient is a tangential historian.     He has cramps in legs. It is in right > left, mostly calf, occasionally in hamstring. It is difficult to walk at first and then improves with walking and stretching. He has not attempted any treatments except ibuprofen. He goes back to bed and symptoms do not reoccur immediately, but can reoccur later at night. Symptoms have been present intermittently for past year. They occur 2-3 times a week at night, and usually awaken him. Symptoms do not usually occur during the day. There is no urge to move.     Taking nyquil seems to have helped the last two nights.     Aamir goes to bed at 10:30 PM during the week in front of the TV. He gets up at 5:30 AM with an alarm. He falls asleep in <30 minutes.  Aamir denies difficulty falling asleep.  He wakes up 0-1 times a night. On weekends, Aamir goes to bed at 12 AM.  He gets up at 7:00 AM without an alarm.     Aamir does snore. Patient does not have a regular bed partner.  He does have a history of witnessed apneas when wife was alive. Patient sleeps on his back and side. He has occasional morning headaches (1/week), denies no restless legs.     Aamir denies any sleep walking, sleep talking and dream enactment.    Patient describes themself as a morning person.  Patient's [place] Sleepiness score 4/24 inconsistent with excessive  daytime sleepiness.      Aamir naps infrequently.  He takes infrequent inadvertant naps.  He denies dozing while driving. He uses 3-4  cups/day of coffee. Last caffeine intake is usually before work.      AKSHAT 1/2018  Impression:   1. Right leg: Normal triphasic waveforms.  AKSHAT measures 1.2 (normal).  2. Left leg: Normal triphasic waveforms.  AKHSAT measures 1.160 (normal).    Component      Latest Ref Rng & Units 12/26/2017   WBC      4.0 - 11.0 10e9/L 10.4   RBC Count      4.4 - 5.9 10e12/L 4.97   Hemoglobin      13.3 - 17.7 g/dL 15.9   Hematocrit      40.0 - 53.0 % 46.1   MCV      78 - 100 fl 93   MCH      26.5 - 33.0 pg 32.0   MCHC      31.5 - 36.5 g/dL 34.5   RDW      10.0 - 15.0 % 12.0   Platelet Count      150 - 450 10e9/L 287   Diff Method       Automated Method   % Neutrophils      % 58.7   % Lymphocytes      % 28.0   % Monocytes      % 7.6   % Eosinophils      % 4.5   % Basophils      % 0.8   % Immature Granulocytes      % 0.4   Absolute Neutrophil      1.6 - 8.3 10e9/L 6.1   Absolute Lymphocytes      0.8 - 5.3 10e9/L 2.9   Absolute Monocytes      0.0 - 1.3 10e9/L 0.8   Absolute Eosinophils      0.0 - 0.7 10e9/L 0.5   Absolute Basophils      0.0 - 0.2 10e9/L 0.1   Abs Immature Granulocytes      0 - 0.4 10e9/L 0.0   Sodium      133 - 144 mmol/L 137   Potassium      3.4 - 5.3 mmol/L 4.5   Chloride      94 - 109 mmol/L 107   Carbon Dioxide      20 - 32 mmol/L 25   Anion Gap      3 - 14 mmol/L 5   Glucose      70 - 99 mg/dL 89   Urea Nitrogen      7 - 30 mg/dL 14   Creatinine      0.66 - 1.25 mg/dL 0.84   GFR Estimate      >60 mL/min/1.7m2 >90   GFR Estimate If Black      >60 mL/min/1.7m2 >90   Calcium      8.5 - 10.1 mg/dL 8.8   Bilirubin Total      0.2 - 1.3 mg/dL 0.3   Albumin      3.4 - 5.0 g/dL 4.0   Protein Total      6.8 - 8.8 g/dL 7.6   Alkaline Phosphatase      40 - 150 U/L 59   ALT      0 - 70 U/L 54   AST      0 - 45 U/L 36   TSH      0.40 - 4.00 mU/L 1.68   Vitamin D Deficiency screening      20 - 75 ug/L 16 (L)   Hemoglobin A1C      4.3 - 6.0 % 5.4   Vitamin B12      193 - 986 pg/mL 389   Methylmalonic Acid      0.00 - 0.40 umol/L  0.19   Magnesium      1.6 - 2.3 mg/dL 2.4 (H)   CRP Inflammation      0.0 - 8.0 mg/L 5.4   Sed Rate      0 - 20 mm/h 5   Rheumatoid Factor      <20 IU/mL <20   Ferritin      26 - 388 ng/mL 331       Allergies:    No Known Allergies    Medications:    Current Outpatient Prescriptions   Medication Sig Dispense Refill     IBUPROFEN PO Take by mouth as needed for moderate pain       vitamin D (ERGOCALCIFEROL) 25741 UNIT capsule Take 1 capsule (50,000 Units) by mouth every 7 days for 8 doses 8 capsule 0       Problem List:  Patient Active Problem List    Diagnosis Date Noted     Erectile dysfunction, unspecified erectile dysfunction type 08/15/2016     Priority: Medium     Obesity (BMI 30-39.9) 10/01/2013     Priority: Medium     Tobacco use disorder 10/01/2013     Priority: Medium     Elevated fasting glucose 10/01/2013     Priority: Medium     Advanced directives, counseling/discussion 07/06/2012     Priority: Medium     Discussed advance care planning with patient; information given to patient to review. 7/6/2012          Hyperlipidemia LDL goal <130 07/06/2012     Priority: Medium     [place] 10-year CHD Risk Score: 25% (16 Total Points)   Values used to calculate score:     Age: 56 years -- Points: 8     Total Cholesterol: 218 mg/dL -- Points: 3     HDL Cholesterol: 39 mg/dL -- Points: 2     Systolic BP (untreated): 122 mmHg -- Points: 0     The patient is a smoker. -- Points: 3     The patient has not been diagnosed with diabetes. -- Points: 0     The patient does not have a family history of CHD. -- Points:0             Past Medical/Surgical History:  Past Medical History:   Diagnosis Date     NO ACTIVE PROBLEMS      Past Surgical History:   Procedure Laterality Date     HERNIA REPAIR      age 17--right     ROTATOR CUFF REPAIR RT/LT  2002    left        Social History:  Social History     Social History     Marital status:      Spouse name: N/A     Number of children: N/A     Years of education: N/A      Occupational History            [employer]     Social History Main Topics     Smoking status: Current Every Day Smoker     Packs/day: 0.50     Years: 25.00     Types: Cigarettes     Smokeless tobacco: Never Used     Alcohol use Yes      Comment: rarely     Drug use: No     Sexual activity: Not Currently     Other Topics Concern      Service No     Blood Transfusions No     Caffeine Concern No     Occupational Exposure No     Hobby Hazards No     Sleep Concern No     Stress Concern No     Weight Concern No     Special Diet No     Back Care No     Exercise Yes     Walking dog     Seat Belt Yes     Self-Exams Yes     Social History Narrative       Family History:  Family History   Problem Relation Age of Onset     Unknown/Adopted Daughter      Hypertension Mother      Eye Disorder Mother      cataracts, glucoma        Review of Systems:  A complete review of systems reviewed by me is negative with the exeption of what has been mentioned in the history of present illness.  CONSTITUTIONAL: NEGATIVE for weight gain/loss, fever, chills, sweats or night sweats, drug allergies.  EYES: NEGATIVE for changes in vision, blind spots, double vision.  ENT: NEGATIVE for ear pain, sore throat, sinus pain, post-nasal drip, runny nose, bloody nose  CARDIAC: NEGATIVE for fast heartbeats or fluttering in chest, chest pain or pressure, breathlessness when lying flat, swollen legs or swollen feet.  NEUROLOGIC:  POSITIVE for  headaches  DERMATOLOGIC: NEGATIVE for rashes, new moles or change in mole(s)  PULMONARY:  POSITIVE for  productive cough  GASTROINTESTINAL: NEGATIVE for nausea or vomitting, loose or watery stools, fat or grease in stools, constipation, abdominal pain, bowel movements black in color or blood noted.  GENITOURINARY: NEGATIVE for pain during urination, blood in urine, urinating more frequently than usual, irregular menstrual periods.  MUSCULOSKELETAL:  POSITIVE for  muscle pain  ENDOCRINE:  "NEGATIVE for increased thirst or urination, diabetes.  LYMPHATIC: NEGATIVE for swollen lymph nodes, lumps or bumps in the breasts or nipple discharge.      Physical Examination:  Vitals: /87  Pulse 78  Ht 1.753 m (5' 9\")  Wt 94.4 kg (208 lb 3.2 oz)  SpO2 96%  BMI 30.75 kg/m2  BMI= Body mass index is 30.75 kg/(m^2).    Neck Cir (cm): 44 cm    Walford Total Score 1/17/2018   Total score - Walford 4     GENERAL APPEARANCE: healthy, alert and no distress  EYES: Eyes grossly normal to inspection and conjunctivae and sclerae normal  HENT: nose and mouth without ulcers or lesions and oropharynx crowded  NECK: no adenopathy, no asymmetry, masses, or scars and thyroid normal to palpation  CV: regular rates and rhythm, normal S1 S2, no S3 or S4 and no murmur, click or rub  ABDOMEN: soft, nontender, without hepatosplenomegaly or masses and bowel sounds normal  MS: extremities normal- no gross deformities noted  NEURO: Normal strength and tone, mentation intact, speech normal and cranial nerves 2-12 intact  PSYCH: mentation appears normal and affect normal/bright  Mallampati Class: II.  Tonsillar Stage: 2  visible at pillars.    Impression/Plan:    Nocturnal leg cramps. Suspect related to vitamin D deficiency  -Start vitamin D  -Discussed stretching exercises    Snoring, apneas, obesity. Minimal symptoms except occasional morning headaches. No comorbidities. He does not need to treat obstructive sleep apnea for primary prevention of CV disease due to his age. I doubt  sleep disordered breathing causing leg cramps unless he has hypoxemia. Options discussed.  -Will check overnight oximetry      Stanley Camacho     CC: Juan R Proctor        "

## 2018-01-18 ENCOUNTER — DOCUMENTATION ONLY (OUTPATIENT)
Dept: SLEEP MEDICINE | Facility: CLINIC | Age: 63
End: 2018-01-18
Payer: COMMERCIAL

## 2018-01-18 PROBLEM — G47.34 NOCTURNAL HYPOXEMIA: Status: ACTIVE | Noted: 2018-01-18

## 2018-01-18 RX ORDER — ZOLPIDEM TARTRATE 5 MG/1
TABLET ORAL
Qty: 1 TABLET | Refills: 0 | Status: SHIPPED | OUTPATIENT
Start: 2018-01-18 | End: 2018-04-10

## 2018-01-18 NOTE — PROGRESS NOTES
Overnight oximetry does show significant problem with low Oxygen levels  Recommend Polysomnogram (using 4% desaturation/Medicare/2012 AASM 1B scoring rules) for moderate-high probability obstructive sleep apnea. Ambien if needed.   Orders placed

## 2018-01-18 NOTE — PROGRESS NOTES
Overnight oximetry completed by patient. Sent to scanning. Copy to provider. Recording date: 01/17/2018  Duration: 07:24:36  Note: on room air.

## 2018-01-18 NOTE — PROCEDURES
Overnight oximetery (1/17/18) shows clusters of desaturauions  LÁZARO 10.8. Lowest O2 77 %, Sao2 <88% for 14.6 minutes

## 2018-03-12 ENCOUNTER — THERAPY VISIT (OUTPATIENT)
Dept: SLEEP MEDICINE | Facility: CLINIC | Age: 63
End: 2018-03-12
Payer: COMMERCIAL

## 2018-03-12 DIAGNOSIS — G47.34 NOCTURNAL HYPOXEMIA: ICD-10-CM

## 2018-03-12 DIAGNOSIS — E66.9 OBESITY (BMI 30-39.9): Chronic | ICD-10-CM

## 2018-03-12 DIAGNOSIS — R25.2 MUSCLE CRAMP, NOCTURNAL: ICD-10-CM

## 2018-03-12 DIAGNOSIS — R06.00 DYSPNEA AND RESPIRATORY ABNORMALITY: ICD-10-CM

## 2018-03-12 DIAGNOSIS — R06.89 DYSPNEA AND RESPIRATORY ABNORMALITY: ICD-10-CM

## 2018-03-12 DIAGNOSIS — G47.33 OSA (OBSTRUCTIVE SLEEP APNEA): Chronic | ICD-10-CM

## 2018-03-12 PROCEDURE — 95810 POLYSOM 6/> YRS 4/> PARAM: CPT | Performed by: INTERNAL MEDICINE

## 2018-03-12 NOTE — MR AVS SNAPSHOT
After Visit Summary   3/12/2018    Aamir Wilkins    MRN: [id number]           Patient Information     Date Of Birth          1955        Visit Information        Provider Department      3/12/2018 8:00 PM BK BED 1 [facility]        Today's Diagnoses     Muscle cramp, nocturnal        Dyspnea and respiratory abnormality        Nocturnal hypoxemia        Obesity (BMI 30-39.9)           Follow-ups after your visit        Your next 10 appointments already scheduled     Apr 10, 2018  9:00 AM CDT   Return Sleep Patient with Stanley Camacho MD   [facility] ([facility])    [address] [place] [id number] [phone]              Who to contact     If you have questions or need follow up information about today's clinic visit or your schedule please contact [facility] directly at [phone].  Normal or non-critical lab and imaging results will be communicated to you by MyChart, letter or phone within 4 business days after the clinic has received the results. If you do not hear from us within 7 days, please contact the clinic through [employer] or phone. If you have a critical or abnormal lab result, we will notify you by phone as soon as possible.  Submit refill requests through [employer] or call your pharmacy and they will forward the refill request to us. Please allow 3 business days for your refill to be completed.          Additional Information About Your Visit        MyChart Information     [employer] gives you secure access to your electronic health record. If you see a primary care provider, you can also send messages to your care team and make appointments. If you have questions, please call your primary care clinic.  If you do not have a primary care provider, please call [phone] and they will assist you.        Care EveryWhere ID     This is your Care EveryWhere ID. This could be used by  other organizations to access your [place] medical records  [id number]         Blood Pressure from Last 3 Encounters:   01/17/18 132/87   12/26/17 126/72   06/19/17 122/80    Weight from Last 3 Encounters:   01/17/18 94.4 kg (208 lb 3.2 oz)   12/26/17 94 kg (207 lb 3.2 oz)   06/19/17 90 kg (198 lb 8 oz)              We Performed the Following     Comprehensive Sleep Study        Primary Care Provider Office Phone # Fax #    Juan R Proctor [phone] [address]  [facility] [address]        Equal Access to Services     [facility] : Hadii ankush nugent hadasho Somalka, waaxda luqadaha, qaybta kaalmada adeligiayada, patricia harrison . So [facility] [phone].    ATENCIÓN: Si habla español, tiene a mendoza disposición servicios gratuitos de asistencia lingüística. [facility] [phone].    We comply with applicable federal civil rights laws and [place] laws. We do not discriminate on the basis of race, color, national origin, age, disability, sex, sexual orientation, or gender identity.            Thank you!     Thank you for choosing [facility]  for your care. Our goal is always to provide you with excellent care. Hearing back from our patients is one way we can continue to improve our services. Please take a few minutes to complete the written survey that you may receive in the mail after your visit with us. Thank you!             Your Updated Medication List - Protect others around you: Learn how to safely use, store and throw away your medicines at www.disposemymeds.org.          This list is accurate as of 3/12/18 11:59 PM.  Always use your most recent med list.                   Brand Name Dispense Instructions for use Diagnosis    IBUPROFEN PO      Take by mouth as needed for moderate pain        zolpidem 5 MG tablet    AMBIEN    1 tablet    Take tablet by mouth 15 minutes prior to sleep, for Sleep Study    Muscle cramp, nocturnal, Dyspnea and  respiratory abnormality

## 2018-03-15 PROBLEM — G47.33 OSA (OBSTRUCTIVE SLEEP APNEA): Chronic | Status: ACTIVE | Noted: 2018-03-15

## 2018-03-15 NOTE — PROCEDURES
" SLEEP STUDY INTERPRETATION  DIAGNOSTIC POLYSOMNOGRAPHY REPORT      Patient: BELKYS PATRICK  YOB: 1955  Study Date: 3/12/2018  MRN: [id number]  Referring Provider: LUIGI Proctor  Ordering Provider: Stanley Camacho MD      Indications for Polysomnography: The patient is a 62 y old Male who is 5' 9\" and weighs 203.0 lbs. His BMI is 30.1, [place] sleepiness scale 4.0 and neck circumference is 44.0 cm. A diagnostic polysomnogram was performed to evaluate for snoring, apneas, obesity, abnormal overnight oximetry. Minimal symptoms except occasional morning headaches      Polysomnogram Data: A full night polysomnogram recorded the standard physiologic parameters including EEG, EOG, EMG, ECG, nasal and oral airflow. Respiratory parameters of chest and abdominal movements were recorded with respiratory inductance plethysmography. Oxygen saturation was recorded by pulse oximetry. Hypopnea scoring rule used: 1B 4%.      Sleep Architecture:   The total recording time of the polysomnogram was 409.3 minutes. The total sleep time was 245.5 minutes. Sleep latency was increased at 36.4 minutes without the use of a sleep aid. REM latency was 56.5 minutes. Arousal index was 31.5 arousals per hour. Sleep efficiency was decreased at 60.0%. Wake after sleep onset was 126.5 minutes. The patient spent 22.0% of total sleep time in Stage N1, 35.0% in Stage N2, 12.2% in Stage N3, and 30.8% in REM. Time in REM supine was 0 minutes.      Respiration:     Events ? The polysomnogram revealed a presence of 3 obstructive, 2 central, and 0 mixed apneas resulting in an apnea index of 1.2 events per hour. There were 26 obstructive hypopneas and 0 central hypopneas resulting in an obstructive hypopnea index of 6.4 and central hypopnea index of 0 events per hour. The combined apnea/hypopnea index was 7.6 events per hour (central apnea/hypopnea index was 0.5 events per hour). The (lateral) REM AHI was 7.9 events per hour. The supine AHI was 28.5 " events per hour. The RERA index was 6.4 events per hour.  The RDI was 13.9 events per hour.    Snoring - was reported as loud and intermittent.    Respiratory rate and pattern - was notable for normal respiratory rate and pattern.    Sustained Sleep Associated Hypoventilation - Transcutaneous carbon dioxide monitoring was not used, however significant hypoventilation was not suggested by oximetry    Sleep Associated Hypoxemia - (Greater than 5 minutes O2 sat at or below 88%) Baseline oxygen saturation was 92.1%. Lowest oxygen saturation was 84.3%. Time spent less than or equal to 88% was 2.6 minutes. Time spent less than or equal to 89% was 8.5 minutes.    Movement Activity:     Periodic Limb Activity - There were 271 PLMs during the entire study. The PLM index was 66.2 movements per hour. The PLM Arousal Index was 6.1 per hour.    REM EMG Activity - Excessive transient/sustained muscle activity was not present.    Nocturnal Behavior - Abnormal sleep related behaviors were not noted     Bruxism - None apparent.      Cardiac Summary:   The average pulse rate was 73.7 bpm. The minimum pulse rate was 65.5 bpm while the maximum pulse rate was 92.1 bpm.  Arrhythmias were noted.      Assessment:     Mild obstructive sleep apnea    Periodic limb movements of sleep    Recommendations:    Consider treatment with Auto?titrating PAP therapy with a range of 5 to 18 cmH2O. Recommend clinical follow up with sleep management team.    Patient may be a candidate for dental appliance through referral to Sleep Dentistry for the treatment of obstructive sleep apnea and/or socially disruptive snoring.    Consider use pf positional therapy    Advice regarding the risks of drowsy driving.    Weight management (if BMI > 30).    Pharmacologic therapy should be used for management of restless legs syndrome only if present and clinically indicated and not based on the presence of periodic limb movements alone.    Diagnostic Codes:    Obstructive Sleep Apnea G47.33     _____________________________________   Electronically Signed By: Stanley Camacho MD 3/15/18           Range(%) Time in range (min)   0.0 - 89.0 8.5   0.0 - 88.0 2.6         Stage Min(mm Hg) Max(mm Hg)   Wake - -   NREM(1+2+3) - -   REM - -       Range(mmHg) Time in range (min)   55.0 - 100.0 -   Excluded data <20.0 & >65.0 409.5

## 2018-04-10 ENCOUNTER — OFFICE VISIT (OUTPATIENT)
Dept: SLEEP MEDICINE | Facility: CLINIC | Age: 63
End: 2018-04-10
Payer: COMMERCIAL

## 2018-04-10 VITALS
WEIGHT: 208 LBS | BODY MASS INDEX: 30.81 KG/M2 | HEIGHT: 69 IN | OXYGEN SATURATION: 97 % | DIASTOLIC BLOOD PRESSURE: 82 MMHG | HEART RATE: 81 BPM | SYSTOLIC BLOOD PRESSURE: 135 MMHG

## 2018-04-10 DIAGNOSIS — G47.33 OSA (OBSTRUCTIVE SLEEP APNEA): Primary | ICD-10-CM

## 2018-04-10 PROCEDURE — 99214 OFFICE O/P EST MOD 30 MIN: CPT | Performed by: INTERNAL MEDICINE

## 2018-04-10 NOTE — PROGRESS NOTES
Sleep Study Follow-Up Visit:    Date on this visit: 4/10/2018    Aamir Wilkins comes in today for follow-up of his sleep study done on 3/12/18 at the [facility] [place] for Nocturnal leg cramps (suspect related to vitamin D deficiency), snoring, apneas, obesity. Minimal symptoms except occasional morning headaches. No comorbidities.     Overnight oximetery (1/17/18) shows clusters of desaturauions  LÁZARO 10.8. Lowest O2 77 %, Sao2 <88% for 14.6 minutes     YOB: 1955 (203.0 lbs)      Sleep Architecture:   The total recording time of the polysomnogram was 409.3 minutes. The total sleep time was 245.5 minutes. Sleep latency was increased at 36.4 minutes without the use of a sleep aid. REM latency was 56.5 minutes. Arousal index was 31.5 arousals per hour. Sleep efficiency was decreased at 60.0%. Wake after sleep onset was 126.5 minutes. The patient spent 22.0% of total sleep time in Stage N1, 35.0% in Stage N2, 12.2% in Stage N3, and 30.8% in REM. Time in REM supine was 0 minutes.        Respiration:     Events ? The polysomnogram revealed a presence of 3 obstructive, 2 central, and 0 mixed apneas resulting in an apnea index of 1.2 events per hour. There were 26 obstructive hypopneas and 0 central hypopneas resulting in an obstructive hypopnea index of 6.4 and central hypopnea index of 0 events per hour. The combined apnea/hypopnea index was 7.6 events per hour (central apnea/hypopnea index was 0.5 events per hour). The (lateral) REM AHI was 7.9 events per hour. The supine AHI was 28.5 events per hour. The RERA index was 6.4 events per hour.  The RDI was 13.9 events per hour.    Snoring - was reported as loud and intermittent.    Respiratory rate and pattern - was notable for normal respiratory rate and pattern.    Sustained Sleep Associated Hypoventilation - Transcutaneous carbon dioxide monitoring was not used, however significant hypoventilation was not suggested by  oximetry    Sleep Associated Hypoxemia - (Greater than 5 minutes O2 sat at or below 88%) Baseline oxygen saturation was 92.1%. Lowest oxygen saturation was 84.3%. Time spent less than or equal to 88% was 2.6 minutes. Time spent less than or equal to 89% was 8.5 minutes.     Movement Activity:     Periodic Limb Activity - There were 271 PLMs during the entire study. The PLM index was 66.2 movements per hour. The PLM Arousal Index was 6.1 per hour.    REM EMG Activity - Excessive transient/sustained muscle activity was not present.    Nocturnal Behavior - Abnormal sleep related behaviors were not noted     Bruxism - None apparent.        Cardiac Summary:   The average pulse rate was 73.7 bpm. The minimum pulse rate was 65.5 bpm while the maximum pulse rate was 92.1 bpm.  Arrhythmias were noted.       These findings were reviewed with patient.     Past medical/surgical history, family history, social history, medications and allergies were reviewed.      Problem List:  Patient Active Problem List    Diagnosis Date Noted     WILL (obstructive sleep apnea)- mild (AHI 7) 03/15/2018     Priority: Medium     3/12/2018 [facility] (203.0 lbs) - AHI 7.6, RDI 13.9, Supine AHI 28.5, REM AHI 7.9, Low O2 84.3%, Time Spent ?88% 2.6 minutes / Time Spent ?89% 8.5 minutes. PLMI 66       Vitamin D deficiency 01/17/2018     Priority: Medium     Muscle cramp, nocturnal 01/17/2018     Priority: Medium     Erectile dysfunction, unspecified erectile dysfunction type 08/15/2016     Priority: Medium     Obesity (BMI 30-39.9) 10/01/2013     Priority: Medium     Tobacco use disorder 10/01/2013     Priority: Medium     Elevated fasting glucose 10/01/2013     Priority: Medium     Advanced directives, counseling/discussion 07/06/2012     Priority: Medium     Discussed advance care planning with patient; information given to patient to review. 7/6/2012          Hyperlipidemia LDL goal <130 07/06/2012     Priority: Medium      [place] 10-year CHD Risk Score: 25% (16 Total Points)   Values used to calculate score:     Age: 56 years -- Points: 8     Total Cholesterol: 218 mg/dL -- Points: 3     HDL Cholesterol: 39 mg/dL -- Points: 2     Systolic BP (untreated): 122 mmHg -- Points: 0     The patient is a smoker. -- Points: 3     The patient has not been diagnosed with diabetes. -- Points: 0     The patient does not have a family history of CHD. -- Points:0             Impression/Plan:    Mild obstructive sleep apnea, principlally supine-related. Options discussed: no treatment, CPAP, positional therapy.     Positional therapy suggested.     He will follow up with me as needed    Twenty-five minutes spent with patient, all of which were spent face-to-face counseling, consulting, coordinating plan of care.      Stanley Camacho     CC: Juan R Proctor

## 2018-04-10 NOTE — MR AVS SNAPSHOT
After Visit Summary   4/10/2018    Amair Wilkins    MRN: [id number]           Patient Information     Date Of Birth          1955        Visit Information        Provider Department      4/10/2018 9:00 AM Stanley Camacho MD [facility]        Today's Diagnoses     WILL (obstructive sleep apnea)    -  1      Care Instructions    Positioning Device zzoma, slumber bump, tennis ball tshirt.   This example shows a pillow that straps around the waist. It may be appropriate for those whose sleep study shows milder sleep apnea that occurs primarily when lying flat on one's back. Preliminary studies have shown benefit but effectiveness at home should be verified.                             Your BMI is Body mass index is 30.72 kg/(m^2).  Weight management is a personal decision.  If you are interested in exploring weight loss strategies, the following discussion covers the approaches that may be successful. Body mass index (BMI) is one way to tell whether you are at a healthy weight, overweight, or obese. It measures your weight in relation to your height.  A BMI of 18.5 to 24.9 is in the healthy range. A person with a BMI of 25 to 29.9 is considered overweight, and someone with a BMI of 30 or greater is considered obese. More than two-thirds of American adults are considered overweight or obese.  Being overweight or obese increases the risk for further weight gain. Excess weight may lead to heart disease and diabetes.  Creating and following plans for healthy eating and physical activity may help you improve your health.  Weight control is part of healthy lifestyle and includes exercise, emotional health, and healthy eating habits. Careful eating habits lifelong are the mainstay of weight control. Though there are significant health benefits from weight loss, long-term weight loss with diet alone may be very difficult to achieve- studies show long-term success with dietary management in  less than 10% of people. Attaining a healthy weight may be especially difficult to achieve in those with severe obesity. In some cases, medications, devices and surgical management might be considered.  What can you do?  If you are overweight or obese and are interested in methods for weight loss, you should discuss this with your provider.     Consider reducing daily calorie intake by 500 calories.     Keep a food journal.     Avoiding skipping meals, consider cutting portions instead.    Diet combined with exercise helps maintain muscle while optimizing fat loss. Strength training is particularly important for building and maintaining muscle mass. Exercise helps reduce stress, increase energy, and improves fitness. Increasing exercise without diet control, however, may not burn enough calories to loose weight.       Start walking three days a week 10-20 minutes at a time    Work towards walking thirty minutes five days a week     Eventually, increase the speed of your walking for 1-2 minutes at time    In addition, we recommend that you review healthy lifestyles and methods for weight loss available through the National Institutes of Health patient information sites:  http://win.niddk.nih.gov/publications/index.htm    And look into health and wellness programs that may be available through your health insurance provider, employer, local community center, or francie club.    Weight management plan: Patient was referred to their PCP to discuss a diet and exercise plan.              Follow-ups after your visit        Follow-up notes from your care team     Return if symptoms worsen or fail to improve.      Who to contact     If you have questions or need follow up information about today's clinic visit or your schedule please contact [facility] directly at [phone].  Normal or non-critical lab and imaging results will be communicated to you by MyChart, letter or phone within 4 business days after  "the clinic has received the results. If you do not hear from us within 7 days, please contact the clinic through [facility] or phone. If you have a critical or abnormal lab result, we will notify you by phone as soon as possible.  Submit refill requests through [employer] or call your pharmacy and they will forward the refill request to us. Please allow 3 business days for your refill to be completed.          Additional Information About Your Visit        [facility] Mobile Meetings Information     [employer] gives you secure access to your electronic health record. If you see a primary care provider, you can also send messages to your care team and make appointments. If you have questions, please call your primary care clinic.  If you do not have a primary care provider, please call [phone] and they will assist you.        Care EveryWhere ID     This is your Care EveryWhere ID. This could be used by other organizations to access your [facility] medical records  [id number]        Your Vitals Were     Pulse Height Pulse Oximetry BMI (Body Mass Index)          81 1.753 m (5' 9\") 97% 30.72 kg/m2         Blood Pressure from Last 3 Encounters:   04/10/18 135/82   01/17/18 132/87   12/26/17 126/72    Weight from Last 3 Encounters:   04/10/18 94.3 kg (208 lb)   01/17/18 94.4 kg (208 lb 3.2 oz)   12/26/17 94 kg (207 lb 3.2 oz)              We Performed the Following     Sleep Positioning Device  ()          Today's Medication Changes          These changes are accurate as of 4/10/18  9:16 AM.  If you have any questions, ask your nurse or doctor.               Stop taking these medicines if you haven't already. Please contact your care team if you have questions.     zolpidem 5 MG tablet   Commonly known as:  AMBIEN   Stopped by:  Stanley Camacho MD                    Primary Care Provider Office Phone # Fax #    Juan R Proctor [phone] [address]  [facility] [address]        Equal Access to Services     " HARRIS DELGADO : Hadii aad jovanna nereida Ayon, waaxda luqadaha, qaybta kaalmada lorenoneliayajaira, patricia elizaldetomigianna hill. So [facility] [phone].    ATENCIÓN: Si habla español, tiene a mendoza disposición servicios gratuitos de asistencia lingüística. Llame al [phone].    We comply with applicable federal civil rights laws and [place] laws. We do not discriminate on the basis of race, color, national origin, age, disability, sex, sexual orientation, or gender identity.            Thank you!     Thank you for choosing [facility]  for your care. Our goal is always to provide you with excellent care. Hearing back from our patients is one way we can continue to improve our services. Please take a few minutes to complete the written survey that you may receive in the mail after your visit with us. Thank you!             Your Updated Medication List - Protect others around you: Learn how to safely use, store and throw away your medicines at www.disposemymeds.org.          This list is accurate as of 4/10/18  9:16 AM.  Always use your most recent med list.                   Brand Name Dispense Instructions for use Diagnosis    IBUPROFEN PO      Take by mouth as needed for moderate pain

## 2018-04-10 NOTE — PATIENT INSTRUCTIONS
Positioning Device veronama, slumber bump, tennis ball tshirt.   This example shows a pillow that straps around the waist. It may be appropriate for those whose sleep study shows milder sleep apnea that occurs primarily when lying flat on one's back. Preliminary studies have shown benefit but effectiveness at home should be verified.                             Your BMI is Body mass index is 30.72 kg/(m^2).  Weight management is a personal decision.  If you are interested in exploring weight loss strategies, the following discussion covers the approaches that may be successful. Body mass index (BMI) is one way to tell whether you are at a healthy weight, overweight, or obese. It measures your weight in relation to your height.  A BMI of 18.5 to 24.9 is in the healthy range. A person with a BMI of 25 to 29.9 is considered overweight, and someone with a BMI of 30 or greater is considered obese. More than two-thirds of American adults are considered overweight or obese.  Being overweight or obese increases the risk for further weight gain. Excess weight may lead to heart disease and diabetes.  Creating and following plans for healthy eating and physical activity may help you improve your health.  Weight control is part of healthy lifestyle and includes exercise, emotional health, and healthy eating habits. Careful eating habits lifelong are the mainstay of weight control. Though there are significant health benefits from weight loss, long-term weight loss with diet alone may be very difficult to achieve- studies show long-term success with dietary management in less than 10% of people. Attaining a healthy weight may be especially difficult to achieve in those with severe obesity. In some cases, medications, devices and surgical management might be considered.  What can you do?  If you are overweight or obese and are interested in methods for weight loss, you should discuss this with your provider.     Consider reducing  daily calorie intake by 500 calories.     Keep a food journal.     Avoiding skipping meals, consider cutting portions instead.    Diet combined with exercise helps maintain muscle while optimizing fat loss. Strength training is particularly important for building and maintaining muscle mass. Exercise helps reduce stress, increase energy, and improves fitness. Increasing exercise without diet control, however, may not burn enough calories to loose weight.       Start walking three days a week 10-20 minutes at a time    Work towards walking thirty minutes five days a week     Eventually, increase the speed of your walking for 1-2 minutes at time    In addition, we recommend that you review healthy lifestyles and methods for weight loss available through the National Institutes of Health patient information sites:  http://win.niddk.nih.gov/publications/index.htm    And look into health and wellness programs that may be available through your health insurance provider, employer, local community center, or francie club.    Weight management plan: Patient was referred to their PCP to discuss a diet and exercise plan.

## 2018-04-10 NOTE — NURSING NOTE
"Chief Complaint   Patient presents with     Results     psg results       Initial /82  Pulse 81  Ht 1.753 m (5' 9\")  Wt 94.3 kg (208 lb)  SpO2 97%  BMI 30.72 kg/m2 Estimated body mass index is 30.72 kg/(m^2) as calculated from the following:    Height as of this encounter: 1.753 m (5' 9\").    Weight as of this encounter: 94.3 kg (208 lb).  Medication Reconciliation: complete       "

## 2018-07-18 ENCOUNTER — TELEPHONE (OUTPATIENT)
Dept: SLEEP MEDICINE | Facility: CLINIC | Age: 63
End: 2018-07-18

## 2018-07-18 DIAGNOSIS — G47.33 OSA (OBSTRUCTIVE SLEEP APNEA): ICD-10-CM

## 2018-07-18 NOTE — TELEPHONE ENCOUNTER
I do not recall the details of this conversation in this regard and did not document anything in this regard. I do remember that there were some unusual aspects to your work-hours.   Usually if this is the focus of our visit I would give advice regarding shift work strategies. I may write a letter to try and change shifts in certain cirumstances.  Please ask him to set up phone or office appointment if he wants to discuss further as I need more details

## 2018-07-18 NOTE — TELEPHONE ENCOUNTER
Patient states that his employer scheduled him for a few shifts coming up that will be too hard to work, he states it was discussed in his last visit with . He is wanting a letter stating he cannot work the shifts they are scheduling him for. Please discuss with him [phone]

## 2018-07-20 NOTE — TELEPHONE ENCOUNTER
Left message on machine to call back.  April [facility] 7/20/2018 9:10 AM

## 2018-07-23 NOTE — TELEPHONE ENCOUNTER
Pt called back and scheduled appt.  April [facility] 7/23/2018 2:20 PM

## 2018-08-08 ENCOUNTER — OFFICE VISIT (OUTPATIENT)
Dept: SLEEP MEDICINE | Facility: CLINIC | Age: 63
End: 2018-08-08
Payer: COMMERCIAL

## 2018-08-08 VITALS
OXYGEN SATURATION: 96 % | BODY MASS INDEX: 30.36 KG/M2 | DIASTOLIC BLOOD PRESSURE: 86 MMHG | SYSTOLIC BLOOD PRESSURE: 125 MMHG | WEIGHT: 205 LBS | HEART RATE: 77 BPM | HEIGHT: 69 IN

## 2018-08-08 DIAGNOSIS — G47.33 OSA (OBSTRUCTIVE SLEEP APNEA): ICD-10-CM

## 2018-08-08 PROCEDURE — 99213 OFFICE O/P EST LOW 20 MIN: CPT | Performed by: INTERNAL MEDICINE

## 2018-08-08 NOTE — PATIENT INSTRUCTIONS

## 2018-08-08 NOTE — NURSING NOTE
"Chief Complaint   Patient presents with     Sleep Problem     shift work f/u       Initial /86  Pulse 77  Ht 1.753 m (5' 9\")  Wt 93 kg (205 lb)  SpO2 96%  BMI 30.27 kg/m2 Estimated body mass index is 30.27 kg/(m^2) as calculated from the following:    Height as of this encounter: 1.753 m (5' 9\").    Weight as of this encounter: 93 kg (205 lb).    Medication Reconciliation: complete      "

## 2018-08-08 NOTE — PROGRESS NOTES
Chief Complaint   Patient presents with     Sleep Problem     shift work f/u       He had a sleep study done on 3/12/18 at the [facility] [place] for nocturnal leg cramps (suspect related to vitamin D deficiency), snoring, apneas, obesity. Minimal symptoms except occasional morning headaches. No comorbidities.      Overnight oximetery (1/17/18) shows clusters of desaturations  LÁZARO 10.8. Lowest O2 77 %, Sao2 <88% for 14.6 minutes      Sleep study 3/12/18 (203.0 lbs)    -The combined apnea/hypopnea index was 7.6 events per hour (central apnea/hypopnea index was 0.5 events per hour). The (lateral) REM AHI was 7.9 events per hour. The supine AHI was 28.5 events per hour. The RERA index was 6.4 events per hour.  The RDI was 13.9 events per hour.  - Lowest oxygen saturation was 84.3%. Time spent less than or equal to 88% was 2.6 minutes. Time spent less than or equal to 89% was 8.5 minutes.  -The PLM index was 66.2 movements per hour. The PLM Arousal Index was 6.1 per hour.    He elected positional therapy.     He has not been using positional therapy, and 'tries' to sleep on his side.     He is concerned about his occasional late shifts 3 am-1130 am.     He has had 1 shift in last 10 months, but one of coworkers quit and now he had a schedule with 7 in a row. He complained and was taken off. He is uncertain how often he will be scheduled there are probably 10-15 people in the pool to draw from.     Usual week day schedule 10 pm to 530 am and weekends 12 am until 7 am. He feels he needs 6-7 hours of sleep.         Mild obstructive sleep apnea  Forms filled out for work and sent to scen  Use of positional therapy encouraged

## 2018-08-08 NOTE — MR AVS SNAPSHOT
After Visit Summary   8/8/2018    Aamir Wilkins    MRN: [id number]           Patient Information     Date Of Birth          1955        Visit Information        Provider Department      8/8/2018 9:00 AM Stanley Camacho MD [facility]        Today's Diagnoses     WILL (obstructive sleep apnea)          Care Instructions      Your BMI is Body mass index is 30.27 kg/(m^2).  Weight management is a personal decision.  If you are interested in exploring weight loss strategies, the following discussion covers the approaches that may be successful. Body mass index (BMI) is one way to tell whether you are at a healthy weight, overweight, or obese. It measures your weight in relation to your height.  A BMI of 18.5 to 24.9 is in the healthy range. A person with a BMI of 25 to 29.9 is considered overweight, and someone with a BMI of 30 or greater is considered obese. More than two-thirds of American adults are considered overweight or obese.  Being overweight or obese increases the risk for further weight gain. Excess weight may lead to heart disease and diabetes.  Creating and following plans for healthy eating and physical activity may help you improve your health.  Weight control is part of healthy lifestyle and includes exercise, emotional health, and healthy eating habits. Careful eating habits lifelong are the mainstay of weight control. Though there are significant health benefits from weight loss, long-term weight loss with diet alone may be very difficult to achieve- studies show long-term success with dietary management in less than 10% of people. Attaining a healthy weight may be especially difficult to achieve in those with severe obesity. In some cases, medications, devices and surgical management might be considered.  What can you do?  If you are overweight or obese and are interested in methods for weight loss, you should discuss this with your provider.     Consider reducing  daily calorie intake by 500 calories.     Keep a food journal.     Avoiding skipping meals, consider cutting portions instead.    Diet combined with exercise helps maintain muscle while optimizing fat loss. Strength training is particularly important for building and maintaining muscle mass. Exercise helps reduce stress, increase energy, and improves fitness. Increasing exercise without diet control, however, may not burn enough calories to loose weight.       Start walking three days a week 10-20 minutes at a time    Work towards walking thirty minutes five days a week     Eventually, increase the speed of your walking for 1-2 minutes at time    In addition, we recommend that you review healthy lifestyles and methods for weight loss available through the National Institutes of Health patient information sites:  http://win.niddk.nih.gov/publications/index.htm    And look into health and wellness programs that may be available through your health insurance provider, employer, local community center, or francie club.    Weight management plan: Patient was referred to their PCP to discuss a diet and exercise plan.              Follow-ups after your visit        Who to contact     If you have questions or need follow up information about today's clinic visit or your schedule please contact [facility] directly at [phone].  Normal or non-critical lab and imaging results will be communicated to you by MyChart, letter or phone within 4 business days after the clinic has received the results. If you do not hear from us within 7 days, please contact the clinic through [employer] or phone. If you have a critical or abnormal lab result, we will notify you by phone as soon as possible.  Submit refill requests through [employer] or call your pharmacy and they will forward the refill request to us. Please allow 3 business days for your refill to be completed.          Additional Information About Your Visit       "  MyChart Information     [email or web address] gives you secure access to your electronic health record. If you see a primary care provider, you can also send messages to your care team and make appointments. If you have questions, please call your primary care clinic.  If you do not have a primary care provider, please call [phone] and they will assist you.        Care EveryWhere ID     This is your Care EveryWhere ID. This could be used by other organizations to access your [facility] medical records  [id number]        Your Vitals Were     Pulse Height Pulse Oximetry BMI (Body Mass Index)          77 1.753 m (5' 9\") 96% 30.27 kg/m2         Blood Pressure from Last 3 Encounters:   08/08/18 125/86   04/10/18 135/82   01/17/18 132/87    Weight from Last 3 Encounters:   08/08/18 93 kg (205 lb)   04/10/18 94.3 kg (208 lb)   01/17/18 94.4 kg (208 lb 3.2 oz)              Today, you had the following     No orders found for display       Primary Care Provider Office Phone # Fax #    Juan R Proctor [phone]       [address]  [facility] [address]        Equal Access to Services     HARRIS DELGADO : Hadii ankush ku hadasho Soomaali, waaxda luqadaha, qaybta kaalmada adeegyada, patricia harrison . So [facility] [phone].    ATENCIÓN: Si habla español, tiene a mendoza disposición servicios gratuitos de asistencia lingüística. Llame al [phone].    We comply with applicable federal civil rights laws and [place] laws. We do not discriminate on the basis of race, color, national origin, age, disability, sex, sexual orientation, or gender identity.            Thank you!     Thank you for choosing [facility]  for your care. Our goal is always to provide you with excellent care. Hearing back from our patients is one way we can continue to improve our services. Please take a few minutes to complete the written survey that you may receive in the mail after your visit with us. Thank " you!             Your Updated Medication List - Protect others around you: Learn how to safely use, store and throw away your medicines at www.disposemymeds.org.          This list is accurate as of 8/8/18  9:51 AM.  Always use your most recent med list.                   Brand Name Dispense Instructions for use Diagnosis    IBUPROFEN PO      Take by mouth as needed for moderate pain

## 2018-08-24 NOTE — TELEPHONE ENCOUNTER
Patient called stating that his  Susi Donovan needed to speak with Dr. Camacho about needing more info in regards to a work matter.  Her numbers are [phone] or [phone].

## 2019-10-10 ENCOUNTER — TELEPHONE (OUTPATIENT)
Dept: SLEEP MEDICINE | Facility: CLINIC | Age: 64
End: 2019-10-10

## 2019-10-10 NOTE — TELEPHONE ENCOUNTER
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 Patient will be emailing the information over to me. I will send to Roma. Patient would like a copy of  Dr. Camacho responds to with their questions.    Minnie Urrutia

## 2019-10-10 NOTE — TELEPHONE ENCOUNTER
Sent copy of email to provider, please respond with letter. Here is copied piece of email :     From: Aamir Wilkins [[email or web address]]   Sent: Thursday, October 10, 2019 10:15 AM  To: Minnie Urrutia  Cc: Aamir Wilkins  Subject: FW: 3am shifts     Please forward this to Dr. Camacho.   My  wants more clarification to put this through.  Her contact info is listed below.   ---------  [employer]  [employer]      [address] [place] [address]  [email or web address]     P. [phone]      F. [phone]      [employer]  ---------  Please forward any note information on to my email addresses in the copy.  Thanks,  Aamir [email or web address]  [email or web address]    From: Susi Cates <[email or web address]>   Sent: Thursday, October 10, 2019 8:24 AM  To: Aamir Wilkins <[email or web address]>  Subject: RE: 3am shifts     Aamir,       The note your physician provided does not indicate your condition limits any major life activity and it also does not indicate you have a record of such impairment. We need clarification from your physician on these points.     Please ask your physician address these two questions as soon as possible.    Thank you!!      [employer]  [employer]      [address] [place] [address]  [email or web address]     P. [phone]      F. [phone]

## 2019-10-14 NOTE — TELEPHONE ENCOUNTER
Patient called today and wanted to speak with you. He wanted to know why his schedule is changed at work. He stated you can reach him at [phone] or you may e-mail his home or work with a response.    Minnie Urrutia

## 2019-10-15 NOTE — TELEPHONE ENCOUNTER
Susi Donovan called me back yesterdayat 1 pm  but there was insufficient time to complete our conversation. I gave the same reccomendations I gave last year:    -No more than 3 nights (preferably 2) nights in a row with a day off afterwards   -No more than 2 periods a month (preferably 1)     Called back twice yestrday afternoon and again this morning and left messages as well as my cell phone number.     Also called patient and left updated message

## 2019-10-16 NOTE — TELEPHONE ENCOUNTER
Pt called back and states that his work is having him work 3 nights in a row next week 10/23/19-10/25/19 (W-F) with Sat/ Sunday off. He states that prior it was a one night occurrence once a month.     I explained that it showed the same notes as last year, but he is addiment it was written one night a month.      Please call or advise how he should proceed.     Hetal Brush MA on 10/16/2019 at 10:25 AM

## 2019-10-22 ENCOUNTER — TELEPHONE (OUTPATIENT)
Dept: SLEEP MEDICINE | Facility: CLINIC | Age: 64
End: 2019-10-22

## 2019-10-22 NOTE — TELEPHONE ENCOUNTER
This patient e-mailed me this today and wanted me to pass it onto you.      I need to have this forwarded to Dr Camacho.     Dr. Camacho,  This is a copy of the work restrictions she sent me. She said she had a conversation with you.   The message you left on my voicemail said you had a quick talk with her.  You said you tried calling her back a few times and were unable to contact her.  Please try to contact her again. The restrictions changed from one day a month to what is listed below.  This changes the restrictions six times that they were. I have not been on that shift in over 3 years.   Starting on Tuesday Oct. 22 I m scheduled to do 3 days. This is a huge change for me. They will take advantage of this a lot.  Please continue contacting her. The original restriction was 1 day a month. Why did it change?    Check the paperwork. Maybe you didn t have it when she called you.  I just turned 64 and extreme changes to my sleep pattern is hard on my body.      Minnie Urrutia

## 2019-10-23 NOTE — TELEPHONE ENCOUNTER
I have made multiple attempts to contact patient directly  At this point he may schedule an office visit otr a telephone visit to discuss

## 2019-10-25 NOTE — TELEPHONE ENCOUNTER
Patient called back today wanting to schedule a phone consult, because to come in to be seen is out until the end of November. Patient is available anytime on November 1, 4th and 5th. If you can give me guidance on when it's okay to schedule a phone consult for Dr. Camacho would be great. Otherwise, I am not sure how to get this scheduled as Dr. Camacho is out today.    Minnie Urrutia

## 2019-11-05 ENCOUNTER — VIRTUAL VISIT (OUTPATIENT)
Dept: SLEEP MEDICINE | Facility: CLINIC | Age: 64
End: 2019-11-05
Payer: COMMERCIAL

## 2019-11-05 DIAGNOSIS — G47.33 OSA (OBSTRUCTIVE SLEEP APNEA): ICD-10-CM

## 2019-11-05 DIAGNOSIS — G47.26 SHIFT WORK SLEEP DISORDER: Primary | ICD-10-CM

## 2019-11-05 PROCEDURE — 99207 ZZC NO CHARGE LOS: CPT | Performed by: INTERNAL MEDICINE

## 2019-11-05 NOTE — PROGRESS NOTES
"Aamir Wilkins is a 64 year old male who is being evaluated via a billable telephone visit.      The patient has been notified of following:     \"This telephone visit will be conducted via a call between you and your physician/provider. We have found that certain health care needs can be provided without the need for a physical exam.  This service lets us provide the care you need with a short phone conversation.  If a prescription is necessary we can send it directly to your pharmacy.  If lab work is needed we can place an order for that and you can then stop by our lab to have the test done at a later time.    If during the course of the call the physician/provider feels a telephone visit is not appropriate, you will not be charged for this service.\"     Consent has been obtained for this service by 1 care team member: yes. See the scanned image in the medical record.    Aamir Wilkins complains of  No chief complaint on file.      I have reviewed and updated the patient's Past Medical History, Social History, Family History and Medication List.    ALLERGIES  Patient has no known allergies.    Cristina Reed CMA   (MA signature)    "

## 2019-11-05 NOTE — PROGRESS NOTES
Chief Complaint   Patient presents with     RECHECK      He had a sleep study done on 3/12/18 at the [facility] for nocturnal leg cramps (suspect related to vitamin D deficiency), snoring, apneas, obesity. Minimal symptoms except occasional morning headaches. No comorbidities.      Overnight oximetery (1/17/18) shows clusters of desaturations  LÁZARO 10.8. Lowest O2 77 %, Sao2 <88% for 14.6 minutes      Sleep study 3/12/18 (203.0 lbs)    -The combined apnea/hypopnea index was 7.6 events per hour (central apnea/hypopnea index was 0.5 events per hour). The (lateral) REM AHI was 7.9 events per hour. The supine AHI was 28.5 events per hour. The RERA index was 6.4 events per hour.  The RDI was 13.9 events per hour.  - Lowest oxygen saturation was 84.3%. Time spent less than or equal to 88% was 2.6 minutes. Time spent less than or equal to 89% was 8.5 minutes.  -The PLM index was 66.2 movements per hour. The PLM Arousal Index was 6.1 per hour.    He elected positional therapy.     There have been multiple phone calls between myself and patient, as well as myself and his employer over the last month. We have had trouble reaching other so have a scheduled phone conversation.     He is again concerned about the late shifts.  I spoke last year and this year with Susi Donovan from  at his office. We discussed standard recommendations for night shift work:   -No more than 3 nights (preferably 2) nights in a row with a day off afterwards   -No more than 2 periods a month (preferably 1)      This year I made I same recommendation to , however they have changed his restriction.     He is working all weekends for the rest of the month. He has had periods of 3 nights in a row, and did not last year. He is very affected by working 3 nights in a row including diarrhea. He wants to know why my recommendations have changed.     I explained that I have made the same recommendations and they had not changed from  last year. I filled out the original form when he was in the office with more conservative recommendations than that above. I was then called by HR at his work and made what I consider reasonable recommendations regarding night shift work (as above). They apparently kept the more conservative recommendation last year.    This year when requested, I merely copied the form from last year, but was again called by HR, and again changed the recommendation to the above accomodation/reccomendation. They apparently are honoring the more liberal interpretation this year.     He is upset and would like a second opinion. I offerred to help set that up.

## 2020-03-02 ENCOUNTER — HEALTH MAINTENANCE LETTER (OUTPATIENT)
Age: 65
End: 2020-03-02

## 2020-06-08 ENCOUNTER — TELEPHONE (OUTPATIENT)
Dept: PEDIATRICS | Facility: CLINIC | Age: 65
End: 2020-06-08

## 2020-06-08 DIAGNOSIS — Z20.822 EXPOSURE TO COVID-19 VIRUS: Primary | ICD-10-CM

## 2020-06-08 NOTE — TELEPHONE ENCOUNTER
Patient covered protests last week for [employer] and is now required to get covid tested. Patient requesting order be placed and a call when order is placed. Please advise.

## 2020-06-11 DIAGNOSIS — Z20.822 EXPOSURE TO COVID-19 VIRUS: ICD-10-CM

## 2020-06-11 LAB
SARS-COV-2 RNA SPEC QL NAA+PROBE: NOT DETECTED
SPECIMEN SOURCE: NORMAL

## 2020-06-11 PROCEDURE — U0003 INFECTIOUS AGENT DETECTION BY NUCLEIC ACID (DNA OR RNA); SEVERE ACUTE RESPIRATORY SYNDROME CORONAVIRUS 2 (SARS-COV-2) (CORONAVIRUS DISEASE [COVID-19]), AMPLIFIED PROBE TECHNIQUE, MAKING USE OF HIGH THROUGHPUT TECHNOLOGIES AS DESCRIBED BY CMS-2020-01-R: HCPCS | Mod: 90 | Performed by: FAMILY MEDICINE

## 2020-06-11 PROCEDURE — 99000 SPECIMEN HANDLING OFFICE-LAB: CPT | Performed by: FAMILY MEDICINE

## 2020-06-11 NOTE — LETTER
June 13, 2020        Aamir Wilkins  [address]  [place] [address]    This letter provides a written record that you were tested for COVID-19 on 6/11/20.   Your result was negative.    This means that we didn t find the virus that causes COVID-19 in your sample. A test may show negative when you do actually have the virus. This can happen when the virus is in the early stages of infection, before you feel illness symptoms.    Even if you don t have symptoms, they may still appear. For safety, it s very important to follow these rules.    Keep yourself away from others (self-isolation):      Stay home. Don t go to work, school or anywhere else.     Stay in your own room (and use your own bathroom), if you can.    Stay away from others in your home. No hugging, kissing or shaking hands. No visitors.    Clean  high touch  surfaces often (doorknobs, counters, handles, etc.). Use a household cleaning spray or wipes.    Cover your mouth and nose with a mask, tissue or washcloth to avoid spreading germs.    Wash your hands and face often with soap and water.    Stay in self-isolation until you meet ALL of the guidelines below:    1. You have had no fever for at least 72 hours (that is 3 full days of no fever without the use of medicine that reduces fevers), AND  2. other symptoms (such as cough, shortness of breath) have gotten better, AND  3. at least 10 days have passed since your symptoms first appeared.    Going back to work  Check with your employer for any guidelines to follow for going back to work.    Employers: This document serves as formal notice that your employee tested negative for COVID-19, as of the testing date shown above.    For questions regarding this letter or your Negative COVID-19 result, call [phone] between 8A to 6:30P (M-F) and 10A to 6:30P (weekends).

## 2020-07-20 ENCOUNTER — VIRTUAL VISIT (OUTPATIENT)
Dept: FAMILY MEDICINE | Facility: CLINIC | Age: 65
End: 2020-07-20
Payer: COMMERCIAL

## 2020-07-20 ENCOUNTER — NURSE TRIAGE (OUTPATIENT)
Dept: NURSING | Facility: CLINIC | Age: 65
End: 2020-07-20

## 2020-07-20 DIAGNOSIS — Z20.822 EXPOSURE TO COVID-19 VIRUS: ICD-10-CM

## 2020-07-20 DIAGNOSIS — Z20.822 EXPOSURE TO COVID-19 VIRUS: Primary | ICD-10-CM

## 2020-07-20 PROCEDURE — U0003 INFECTIOUS AGENT DETECTION BY NUCLEIC ACID (DNA OR RNA); SEVERE ACUTE RESPIRATORY SYNDROME CORONAVIRUS 2 (SARS-COV-2) (CORONAVIRUS DISEASE [COVID-19]), AMPLIFIED PROBE TECHNIQUE, MAKING USE OF HIGH THROUGHPUT TECHNOLOGIES AS DESCRIBED BY CMS-2020-01-R: HCPCS | Performed by: FAMILY MEDICINE

## 2020-07-20 PROCEDURE — 99213 OFFICE O/P EST LOW 20 MIN: CPT | Mod: 95 | Performed by: FAMILY MEDICINE

## 2020-07-20 NOTE — PROGRESS NOTES
"Aamir Wilkins is a 64 year old male who is being evaluated via a billable telephone visit.      The patient has been notified of following:     \"This telephone visit will be conducted via a call between you and your physician/provider. We have found that certain health care needs can be provided without the need for a physical exam.  This service lets us provide the care you need with a short phone conversation.  If a prescription is necessary we can send it directly to your pharmacy.  If lab work is needed we can place an order for that and you can then stop by our lab to have the test done at a later time.    Telephone visits are billed at different rates depending on your insurance coverage. During this emergency period, for some insurers they may be billed the same as an in-person visit.  Please reach out to your insurance provider with any questions.    If during the course of the call the physician/provider feels a telephone visit is not appropriate, you will not be charged for this service.\"    Patient has given verbal consent for Telephone visit?  Yes    What phone number would you like to be contacted at? [phone]    How would you like to obtain your AVS? Toya [place]     Aamir Wilkins is a 64 year old male who presents via phone visit today for the following health issues:    HPI    Covid test to return to work, exposed to known Covid   He does have an appt for a swab today at 2 in [facility]     He says he needs to be tested for COVID. Found out this morning that he was around his son's girlfried all weekend and last week and she just found out she tested positive for COVID this morning. His son and his son's girlfriend and their 2 year old child live with him. He says there is no way to isolate in their home. She had been feeling ill over the weekend. The only symptom that she has now is that she is unable to smell anything. They are not able to isolate at home very easily. She has a " child who is two years old. Her baby was coughing last night. His son has had some cough and sore throat.     He is currently feeling normal.     He has a cough once in awhile that is not unusual for him. Temperature was normal. Denies any fevers, headaches, congestion, shortness of breath, fatigue, headaches, anosmia, etc. He is not interested in quitting smoking at this time.      Worthington, Cristina, RN   Call PCP Within 24 Hours   Disposition   covid   Reason for call    Conversation: covid   (Newest Message First)   Cristina Munoz RN           7/20/20 9:52 AM   Note      Pt calling,  Daughter in law, living in pt home,tested positive for covid 19 this morning     Pt does have a cough , started a couple of years ago.   Pt is a smoker     Pt needs testing to return to work  He denies any other symptoms  Reviewed care advice  Transferred pt to scheduling for phone visit with PCP  Critsina Munoz RN  [facility]     Reason for Disposition    [1] COVID-19 EXPOSURE (Close Contact) within last 14 days AND [2] needs COVID-19 lab test to return to work AND [3] NO symptoms    Additional Information    Negative: COVID-19 has been diagnosed by a healthcare provider (HCP)    Negative: COVID-19 lab test positive    Negative: [1] Symptoms of COVID-19 (e.g., cough, fever, SOB, or others) AND [2] lives in an area with community spread    Negative: [1] Symptoms of COVID-19 (e.g., cough, fever, SOB, or others) AND [2] within 14 days of EXPOSURE (close contact) with diagnosed or suspected COVID-19 patient    Negative: [1] Symptoms of COVID-19 (e.g., cough, fever, SOB, or others) AND [2] within 14 days of travel from high-risk area for COVID-19 community spread (identified by CDC)    Negative: [1] Difficulty breathing (shortness of breath) occurs AND [2] onset > 14 days after COVID-19 EXPOSURE (Close Contact) AND [3] no community spread where patient lives    Negative: [1] Dry cough occurs AND [2] onset > 14 days after  COVID-19 EXPOSURE AND [3] no community spread where patient lives    Negative: [1] Wet cough (i.e., white-yellow, yellow, green, or louise colored sputum) AND [2] onset > 14 days after COVID-19 EXPOSURE AND [3] no community spread where patient lives    Negative: [1] Common cold symptoms AND [2] onset > 14 days after COVID-19 EXPOSURE AND [3] no community spread where patient lives    [facility] Specific Disposition  - [facility] Specific Patient Instructions  COVID 19 Nurse Triage Plan/Patient Instructions    Please be aware that novel coronavirus (COVID-19) may be circulating in the community. If you develop symptoms such as fever, cough, or SOB or if you have concerns about the presence of another infection including coronavirus (COVID-19), please contact your health care provider or visit [email or web address].       Virtual Visit with provider recommended. Reference Visit Selection Guide.    Thank you for taking steps to prevent the spread of this virus.  Limit your contact with others.  Wear a simple mask to cover your cough.  Wash your hands well and often.    Resources  [facility]: About COVID-19: [email or web address]  CDC: What to Do If You're Sick: www.cdc.gov/coronavirus/2019-ncov/about/steps-when-sick.html  CDC: Ending Home Isolation: www.cdc.gov/coronavirus/2019-ncov/hcp/disposition-in-home-patients.html   CDC: Caring for Someone: www.cdc.gov/coronavirus/2019-ncov/if-you-are-sick/care-for-someone.html   [facility]: Interim Guidance for Hospital Discharge to Home: www.health.[facility].[place].us/diseases/coronavirus/hcp/hospdischarge.pdf  [facility] clinical trials (COVID-19 research studies): clinicalaffairs.[facility].edu/um-clinical-trials   Below are the COVID-19 hotlines at the [place] Department of Health ([facility]). Interpreters are available.   For health questions: Call 785-940-2977 or 1-559.546.4369 (7 a.m. to 7 p.m.)  For questions about schools and childcare: Call 140-043-5837 or  3-497-138-8744 (7 a.m. to 7 p.m.)    Protocols used: CORONAVIRUS (COVID-19) EXPOSURE-A- 5.16.20                  Patient Active Problem List   Diagnosis     Advanced directives, counseling/discussion     Hyperlipidemia LDL goal <130     Obesity (BMI 30-39.9)     Tobacco use disorder     Elevated fasting glucose     Erectile dysfunction, unspecified erectile dysfunction type     Vitamin D deficiency     Muscle cramp, nocturnal     WILL (obstructive sleep apnea)- mild (AHI 7)     Past Surgical History:   Procedure Laterality Date     HERNIA REPAIR  1974    age 17--right     LAPAROTOMY EXPLORATORY  1998    RLQ abcess     ROTATOR CUFF REPAIR RT/LT  2002    left        Social History     Tobacco Use     Smoking status: Current Every Day Smoker     Packs/day: 0.50     Years: 25.00     Pack years: 12.50     Types: Cigarettes     Smokeless tobacco: Never Used   Substance Use Topics     Alcohol use: Yes     Comment: 2-3 beers/day      Family History   Problem Relation Age of Onset     Unknown/Adopted Daughter      Hypertension Mother      Eye Disorder Mother         cataracts, glucoma      Diabetes No family hx of      Coronary Artery Disease No family hx of      Colon Cancer No family hx of          Current Outpatient Medications   Medication Sig Dispense Refill     IBUPROFEN PO Take by mouth as needed for moderate pain       No Known Allergies    Reviewed and updated as needed this visit by Provider         Review of Systems   As above        Objective   Reported vitals:  There were no vitals taken for this visit.   healthy, alert and no distress  PSYCH: Alert and oriented times 3; coherent speech, normal   rate and volume, able to articulate logical thoughts, able   to abstract reason, no tangential thoughts, no hallucinations   or delusions  His affect is normal  RESP: No cough, no audible wheezing, able to talk in full sentences  Remainder of exam unable to be completed due to telephone visits    Diagnostic Test  "Results:  none         Assessment/Plan:    1. Exposure to COVID-19 virus     - Symptomatic COVID-19 Virus (Coronavirus) by PCR; Future      Patient Instructions   You need to quarantine until 14 days after your last contact with a person who has COVID (even if your test result is normal).         https://www.cdc.gov/coronavirus/2019-ncov/if-you-are-sick/quarantine.html        Quarantine If You Might Be Sick  Stay home if you might have been exposed to COVID-19  Updated July 16, 2020  Languages  Print  ? Facebook?Twitter?LinkedIn?Email?Syndicate      Centers for Disease Control and Prevention ([place])  [employer] subscribers  What s the difference between quarantine and isolation?            <div class=\"player-unavailable\"><h1 class=\"message\">An error occurred.</h1><div class=\"submessage\"><a href=\"http://[email or web address].com/watch?v=l3s75_[email or web address]\" target=\"_blank\">Try watching this video on www.Amware.com</a>, or enable JavaScript if it is disabled in yo    Quarantine is used to keep someone who might have been exposed to COVID-19 away from others. Quarantine helps prevent spread of disease that can occur before a person knows they are sick or if they are infected with the virus without feeling symptoms. People in quarantine should stay home, separate themselves from others, monitor their health, and follow directions from their state or local health department.  Who needs to quarantine?  Anyone who has been in close contact with someone who has COVID-19.  This includes people who previously had COVID-19 and people who have taken a serologic (antibody) test and have antibodies to the virus.  What counts as close contact?  ? You were within 6 feet of someone who has COVID-19 for at least 15 minutes  ? You provided care at home to someone who is sick with COVID-19  ? You had direct physical contact with the person (touched, hugged, or kissed them)  ? You shared eating or drinking utensils  ? They sneezed, coughed, or somehow got " respiratory droplets on you  Steps to take  Stay home and monitor your health  ? Stay home for 14 days after your last contact with a person who has COVID-19  ? Watch for fever (100.4?F), cough, shortness of breath, or other symptoms of COVID-19  ? If possible, stay away others, especially people who are at higher risk for getting very sick from COVID-19    When to start and end quarantine  You should stay home for 14 days after your last contact with a person who has COVID-19.  For all of the following scenarios, even if you test negative for COVID-19 or feel healthy, you should stay home (quarantine) since symptoms may appear 2 to 14 days after exposure to the virus.     No follow-ups on file.      Phone call duration:  12 minutes    Rhianna Zhu MD

## 2020-07-20 NOTE — LETTER
July 23, 2020        Aamir Wilkins  [id number]  [place] [facility] [id number]    This letter provides a written record that you were tested for COVID-19 on 7/20/2020.       Your result was negative. This means that we didn t find the virus that causes COVID-19 in your sample. A test may show negative when you do actually have the virus. This can happen when the virus is in the early stages of infection, before you feel illness symptoms.    If you have symptoms   Stay home and away from others (self-isolate) until you meet ALL of the guidelines below:    You ve had no fever--and no medicine that reduces fever--for 3 full days (72 hours). And      Your other symptoms have gotten better. For example, your cough or breathing has improved. And     At least 10 days have passed since your symptoms started.    During this time:    Stay home. Don t go to work, school or anywhere else.     Stay in your own room, including for meals. Use your own bathroom if you can.    Stay away from others in your home. No hugging, kissing or shaking hands. No visitors.    Clean  high touch  surfaces often (doorknobs, counters, handles, etc.). Use a household cleaning spray or wipes. You can find a full list on the EPA website at www.epa.gov/pesticide-registration/list-n-disinfectants-use-against-sars-cov-2.    Cover your mouth and nose with a mask, tissue or washcloth to avoid spreading germs.    Wash your hands and face often with soap and water.    Going back to work  Check with your employer for any guidelines to follow for going back to work.    Employers: This document serves as formal notice that your employee tested negative for COVID-19, as of the testing date shown above.

## 2020-07-20 NOTE — PATIENT INSTRUCTIONS
"You need to quarantine until 14 days after your last contact with a person who has COVID (even if your test result is normal).         https://www.cdc.gov/coronavirus/2019-ncov/if-you-are-sick/quarantine.html        Quarantine If You Might Be Sick  Stay home if you might have been exposed to COVID-19  Updated July 16, 2020  Languages  Print  ? Facebook?Twitter?LinkedIn?Email?Syndicate      Centers for Disease Control and Prevention ([employer])  468K subscribers  What s the difference between quarantine and isolation?            <div class=\"player-unavailable\"><h1 class=\"message\">An error occurred.</h1><div class=\"submessage\"><a href=\"http://[email or web address]/watch?v=[email or web address]\" target=\"_blank\">Try watching this video on www.MemberPlanet.com</a>, or enable JaGinicript if it is disabled in yo    Quarantine is used to keep someone who might have been exposed to COVID-19 away from others. Quarantine helps prevent spread of disease that can occur before a person knows they are sick or if they are infected with the virus without feeling symptoms. People in quarantine should stay home, separate themselves from others, monitor their health, and follow directions from their state or local health department.  Who needs to quarantine?  Anyone who has been in close contact with someone who has COVID-19.  This includes people who previously had COVID-19 and people who have taken a serologic (antibody) test and have antibodies to the virus.  What counts as close contact?  ? You were within 6 feet of someone who has COVID-19 for at least 15 minutes  ? You provided care at home to someone who is sick with COVID-19  ? You had direct physical contact with the person (touched, hugged, or kissed them)  ? You shared eating or drinking utensils  ? They sneezed, coughed, or somehow got respiratory droplets on you  Steps to take  Stay home and monitor your health  ? Stay home for 14 days after your last contact with a person who has COVID-19  ? Watch " for fever (100.4?F), cough, shortness of breath, or other symptoms of COVID-19  ? If possible, stay away others, especially people who are at higher risk for getting very sick from COVID-19    When to start and end quarantine  You should stay home for 14 days after your last contact with a person who has COVID-19.  For all of the following scenarios, even if you test negative for COVID-19 or feel healthy, you should stay home (quarantine) since symptoms may appear 2 to 14 days after exposure to the virus.

## 2020-07-20 NOTE — TELEPHONE ENCOUNTER
Pt calling,  Daughter in law, living in pt home,tested positive for covid 19 this morning    Pt does have a cough , started a couple of years ago.   Pt is a smoker    Pt needs testing to return to work  He denies any other symptoms  Reviewed care advice  Transferred pt to scheduling for phone visit with PCP  Cristina Munoz RN  [facility]    Reason for Disposition    [1] COVID-19 EXPOSURE (Close Contact) within last 14 days AND [2] needs COVID-19 lab test to return to work AND [3] NO symptoms    Additional Information    Negative: COVID-19 has been diagnosed by a healthcare provider (HCP)    Negative: COVID-19 lab test positive    Negative: [1] Symptoms of COVID-19 (e.g., cough, fever, SOB, or others) AND [2] lives in an area with community spread    Negative: [1] Symptoms of COVID-19 (e.g., cough, fever, SOB, or others) AND [2] within 14 days of EXPOSURE (close contact) with diagnosed or suspected COVID-19 patient    Negative: [1] Symptoms of COVID-19 (e.g., cough, fever, SOB, or others) AND [2] within 14 days of travel from high-risk area for COVID-19 community spread (identified by CDC)    Negative: [1] Difficulty breathing (shortness of breath) occurs AND [2] onset > 14 days after COVID-19 EXPOSURE (Close Contact) AND [3] no community spread where patient lives    Negative: [1] Dry cough occurs AND [2] onset > 14 days after COVID-19 EXPOSURE AND [3] no community spread where patient lives    Negative: [1] Wet cough (i.e., white-yellow, yellow, green, or louise colored sputum) AND [2] onset > 14 days after COVID-19 EXPOSURE AND [3] no community spread where patient lives    Negative: [1] Common cold symptoms AND [2] onset > 14 days after COVID-19 EXPOSURE AND [3] no community spread where patient lives    [facility] Specific Disposition  - [facility] Specific Patient Instructions  COVID 19 Nurse Triage Plan/Patient Instructions    Please be aware that novel coronavirus (COVID-19)  may be circulating in the community. If you develop symptoms such as fever, cough, or SOB or if you have concerns about the presence of another infection including coronavirus (COVID-19), please contact your health care provider or visit [email or web address].       Virtual Visit with provider recommended. Reference Visit Selection Guide.    Thank you for taking steps to prevent the spread of this virus.  Limit your contact with others.  Wear a simple mask to cover your cough.  Wash your hands well and often.    Resources  [facility]: About COVID-19: [email or web address]  CDC: What to Do If You're Sick: www.cdc.gov/coronavirus/2019-ncov/about/steps-when-sick.html  CDC: Ending Home Isolation: www.cdc.gov/coronavirus/2019-ncov/hcp/disposition-in-home-patients.html   CDC: Caring for Someone: www.cdc.gov/coronavirus/2019-ncov/if-you-are-sick/care-for-someone.html   [facility]: Interim Guidance for Hospital Discharge to Home: www.[facility].mn./diseases/coronavirus/hcp/hospdischarge.pdf  [facility] clinical trials (COVID-19 research studies): clinicalaffairs.[facility]-clinical-trials   Below are the COVID-19 hotlines at the [place] Department of Health ([facility]). Interpreters are available.   For health questions: Call 440-215-9345 or 1-869.323.5302 (7 a.m. to 7 p.m.)  For questions about schools and childcare: Call 224-116-5148 or 1-653.312.4832 (7 a.m. to 7 p.m.)    Protocols used: CORONAVIRUS (COVID-19) EXPOSURE-A-AH 5.16.20

## 2020-07-21 LAB
SARS-COV-2 RNA SPEC QL NAA+PROBE: NOT DETECTED
SPECIMEN SOURCE: NORMAL

## 2020-08-04 ENCOUNTER — DOCUMENTATION ONLY (OUTPATIENT)
Dept: FAMILY MEDICINE | Facility: CLINIC | Age: 65
End: 2020-08-04

## 2020-08-06 NOTE — PROGRESS NOTES
Please attach a copy of my last visit note with pt and send back to [facility]. Rhianna Zhu M.D.

## 2020-12-14 ENCOUNTER — HEALTH MAINTENANCE LETTER (OUTPATIENT)
Age: 65
End: 2020-12-14

## 2021-04-18 ENCOUNTER — HEALTH MAINTENANCE LETTER (OUTPATIENT)
Age: 66
End: 2021-04-18

## 2021-06-03 NOTE — PROGRESS NOTES
Caio Rutledge is a 65 year old who presents for the following health issues     HPI     ED/ Followup:    Facility:  [facility]  Date of visit: 05/28/2021  Reason for visit: neck pain  Current Status: Pain is doing better, saw chiro 5/29 and this really helped. The meds that were prescribed at ED did not help  MDM:   Aamir Wilkins is a 65 y.o. male with a history of hyperlipidemia and WILL who is presenting with persistent neck pain. The patient was seen in the emergency department yesterday for this issue. He underwent CT imaging of the head and neck which showed no acute abnormalities, though did note multilevel degenerative disc disease and neuroforaminal stenosis of the C-spine. Patient was given prescriptions for lidocaine patches and Robaxin. He was also incidentally to noted to be hypertensive and was started on low-dose lisinopril. He states that he has not been getting any pain relief with these interventions, and also notes he works as a  and is concerned about carrying around heavy equipment with his pain, prompting his ED visit today. His initial vitals here are notable for hypertension, though this does appear to be improved compared to yesterday's readings. Patient has significant right paraspinous cervical tenderness without midline tenderness. He does report some radiation past his right shoulder, suspicious for neuropathy. Patient received a dose of IM Toradol here as well as a warm compress, and on reevaluation states he feels improved. He was provided with a prescription for a Medrol Dosepak as well as for a few tablets of Norco to take at night to help him sleep. He was instructed to discontinue the Robaxin if he is going to take the Norco. He otherwise has follow-up scheduled with his primary care provider in 1 week for reevaluation. He was counseled to return the emergency department for any new or worsening symptoms such as severe  headaches, or weakness or numbness in his upper extremities, fevers, or for any other concerns. Patient was also instructed not to do any heavy lifting with his right arm for the next several days. Patient is agreeable to this plan and he was discharged in stable condition.    DIAGNOSIS:  ICD-10-CM   1. Neck pain M54.2   2. Neck arthritis M47.812   Was seen in the ER for his neck pain   Saw the chiropractor and that seemed   Had pain going into the right shoulder and the pain is a lot better         Also wants to follow up on BP med today, no side effect from the med, takes everyday  Never been on BP medications before   Mom had HTN but lived to 92  Has been on the BP        Labs reviewed in EPIC  BP Readings from Last 3 Encounters:   06/04/21 (!) 174/96   08/08/18 125/86   04/10/18 135/82    Wt Readings from Last 3 Encounters:   06/04/21 93 kg (205 lb)   08/08/18 93 kg (205 lb)   04/10/18 94.3 kg (208 lb)                  Patient Active Problem List   Diagnosis     Advanced directives, counseling/discussion     Hyperlipidemia LDL goal <130     Obesity (BMI 30-39.9)     Tobacco use disorder     Elevated fasting glucose     Erectile dysfunction, unspecified erectile dysfunction type     Vitamin D deficiency     Muscle cramp, nocturnal     WILL (obstructive sleep apnea)- mild (AHI 7)     Past Surgical History:   Procedure Laterality Date     HERNIA REPAIR  1974    age 17--right     LAPAROTOMY EXPLORATORY  1998    RLQ abcess     ROTATOR CUFF REPAIR RT/LT  2002    left        Social History     Tobacco Use     Smoking status: Current Every Day Smoker     Packs/day: 0.50     Years: 25.00     Pack years: 12.50     Types: Cigarettes     Smokeless tobacco: Never Used   Substance Use Topics     Alcohol use: Yes     Comment: 2-3 beers/day      Family History   Problem Relation Age of Onset     Unknown/Adopted Daughter      Hypertension Mother      Eye Disorder Mother         cataracts, glucoma      Diabetes No family hx of   "    Coronary Artery Disease No family hx of      Colon Cancer No family hx of          Current Outpatient Medications   Medication Sig Dispense Refill     HYDROcodone-acetaminophen (NORCO) 5-325 MG tablet        IBUPROFEN PO Take by mouth as needed for moderate pain       lisinopril (ZESTRIL) 20 MG tablet        No Known Allergies      Review of Systems   CONSTITUTIONAL:NEGATIVE for fever, chills, change in weight  INTEGUMENTARY/SKIN: NEGATIVE for worrisome rashes, moles or lesions  ENT/MOUTH: NEGATIVE for ear, mouth and throat problems  RESP:NEGATIVE for significant cough or SOB  CV: NEGATIVE for chest pain, palpitations or peripheral edema  GI: NEGATIVE for nausea, abdominal pain, heartburn, or change in bowel habits  MUSCULOSKELETAL: POSITIVE  for neck pain improved and NEGATIVE for joint swelling  and joint warmth   NEURO: NEGATIVE for weakness, dizziness or paresthesias  ENDOCRINE: NEGATIVE for temperature intolerance, skin/hair changes  HEME/ALLERGY/IMMUNE: NEGATIVE for bleeding problems      Objective    BP (!) 174/96   Pulse 73   Temp 97.4  F (36.3  C) (Tympanic)   Resp 16   Ht 1.753 m (5' 9\")   Wt 93 kg (205 lb)   SpO2 96%   BMI 30.27 kg/m    Body mass index is 30.27 kg/m .   Wt Readings from Last 4 Encounters:   06/04/21 93 kg (205 lb)   08/08/18 93 kg (205 lb)   04/10/18 94.3 kg (208 lb)   01/17/18 94.4 kg (208 lb 3.2 oz)       Physical Exam   GENERAL: Patient is well nourished, well developed, obese,in no apparent distress, non-toxic, in no respiratory distress and acyanotic, alert, cooperative and well hydrated  EYES: Eyes grossly normal to inspection and conjunctivae and sclerae normal  HENT:ear canals and TM's normal and nose and mouth without ulcers or lesions   NECK:normal, supple and no adenopathy  CARDIAC:regular rates and rhythm, no murmur, click or rub and no irregular beats  without LE edema bilaterally  RESP: normal respiratory rate and rhythm, lungs clear to auscultation  unlabored " respirations, no intercostal retractions or accessory muscle use  ABD:soft, nontender  SKIN: Skin color, texture, turgor normal. No rashes or lesions.  MS: extremities normal- no gross deformities noted, gait normal and normal muscle tone  Inspection: normal cervical lordosis  Non-tender:  spinous processes  Range of Motion:  Full ROM of cervical spine  Strength: Full strength of all neck muscles  NEURO: mentation intact and speech normal  PSYCH: Alert, oriented, thought content appropriate,mentation appears normal., affect and mood normal    CT ANGIO NECK5/27/2021  [facility]   Result Impression   IMPRESSION:    No significant abnormality involving the arteries in the neck.    REPORT SIGNED BY DR. Hugo Medrano   Other Result Information   This result has an attachment that is not available.   Result Narrative   EXAM: CT ANGIOGRAM OF NECK WITH IV CONTRAST MATERIAL ENHANCEMENT    DATE: 5/27/2021 6:34 PM    CLINICAL: Dizziness, right neck pain, hypertension, numbness.    COMPARISON: Cervical spine CT scan done at the same time.    TECHNIQUE: A CT angiogram [CTA] of the neck was performed.  Axial, sagittal, and coronal images of the neck reconstructed from level of aortic arch to [place] from data acquired during peak arterial enhancement following rapid-bolus IV contrast material administration.  Additional 3D [3-dimensional] multiplanar reconstructions created by the radiologist on an independent workstation using eMindful[employer] 3-D software.  Angiographic internal carotid stenosis calculation is derived by criteria similar to NASCET, using the distal lumen of the ipsilateral internal carotid artery as the denominator and the narrowest segment as the numerator for the stenosis measurement. 100 mL Omnipaque 350 given intravenously.    FINDINGS:     Conventional arch anatomy.    Common carotid arteries bilaterally are widely patent. Carotid bulbs and cervical portions of the internal carotid arteries  bilaterally are patent. Slight changes of atherosclerosis seen at the carotid bulbs bilaterally but no stenosis. No dissection or other abnormality in the common carotid or internal carotid arteries.    Vertebral and basilar arteries: The vertebral and basilar arteries are patent without hemodynamically significant stenosis.     The anterior and posterior intracranial circulations were incompletely included, with the examination extending not quite to the level of the carotid termini and barely to the tip of the basilar artery, but no significant intracranial abnormality seen.     CT SPINE CERVICAL W/O CON5/27/2021  [facility]   Result Impression   IMPRESSION:    No acute finding. Negative for fracture. Multilevel degenerative disc disease and degenerative facet joint arthrosis. No high-grade central stenosis. Neural foraminal stenosis is present at multiple levels bilaterally, generally slight or moderate and not high-grade.    REPORT SIGNED BY DR. Hugo Medrano       Results for orders placed or performed in visit on 06/04/21   Lipid panel reflex to direct LDL Fasting     Status: None   Result Value Ref Range    Cholesterol 182 <200 mg/dL    Triglycerides 126 <150 mg/dL    HDL Cholesterol 60 >39 mg/dL    LDL Cholesterol Calculated 97 <100 mg/dL    Non HDL Cholesterol 122 <130 mg/dL   Albumin Random Urine Quantitative with Creat Ratio     Status: None   Result Value Ref Range    Creatinine Urine 72 mg/dL    Albumin Urine mg/L 12 mg/L    Albumin Urine mg/g Cr 16.99 0 - 17 mg/g Cr   Comprehensive metabolic panel     Status: Abnormal   Result Value Ref Range    Sodium 135 133 - 144 mmol/L    Potassium 4.5 3.4 - 5.3 mmol/L    Chloride 102 94 - 109 mmol/L    Carbon Dioxide 28 20 - 32 mmol/L    Anion Gap 5 3 - 14 mmol/L    Glucose 100 (H) 70 - 99 mg/dL    Urea Nitrogen 16 7 - 30 mg/dL    Creatinine 0.92 0.66 - 1.25 mg/dL    GFR Estimate 86 >60 mL/min/[1.73_m2]    GFR Estimate If Black >90 >60 mL/min/[1.73_m2]     Calcium 9.1 8.5 - 10.1 mg/dL    Bilirubin Total 0.6 0.2 - 1.3 mg/dL    Albumin 4.0 3.4 - 5.0 g/dL    Protein Total 7.2 6.8 - 8.8 g/dL    Alkaline Phosphatase 53 40 - 150 U/L    ALT 89 (H) 0 - 70 U/L    AST 43 0 - 45 U/L   TSH with free T4 reflex     Status: None   Result Value Ref Range    TSH 2.79 0.40 - 4.00 mU/L   Prostate spec antigen screen     Status: None   Result Value Ref Range    PSA 1.92 0 - 4 ug/L     Assessment & Plan       Cervical pain  This pain seems to be improving with chiropractor so will not add anything on at this time and will follow up as needed    HTN, goal below 140/90  HTN Plan:  1)  Medication: dosage change: increase lisinopril to 40 mg a day   2)  Dietary sodium restriction  3)  Regular aerobic exercise  4)  Recheck in 2 weeks for BP check , sooner should new symptoms or   problems arise.  5) See todays orders.    Patient Education: Reviewed risks of hypertension and principles of   treatment.  - JUST IN CASE  - Albumin Random Urine Quantitative with Creat Ratio  - Comprehensive metabolic panel  - lisinopril (ZESTRIL) 40 MG tablet  Dispense: 30 tablet; Refill: 1  Make appointment for blood pressure check only at pharmacy or Nurse visit  in 2 weeks      CARDIOVASCULAR SCREENING; LDL GOAL LESS THAN 160  Will follow up and/or notify patient of  results via My Chart to determine further need for followup  - Lipid panel reflex to direct LDL Fasting    Screening for diabetes mellitus (DM)  Will follow up and/or notify patient of  results via My Chart to determine further need for followup  - JUST IN CASE  - Comprehensive metabolic panel    Screening for prostate cancer  Will follow up and/or notify patient of  results via My Chart to determine further need for followup  - Prostate spec antigen screen    Screening for thyroid disorder  Will follow up and/or notify patient of  results via My Chart to determine further need for followup  - TSH with free T4 reflex    Screen for colon cancer  -  "GASTROENTEROLOGY ADULT REF PROCEDURE ONLY    Elevated LFTs  Recheck not fasting in a few weeks   - Hepatic panel    Patient Instructions     PLAN:   1.   Symptomatic therapy suggested: increase lisinopril to 40 mg a day.  2.  Orders Placed This Encounter   Medications     DISCONTD: lisinopril (ZESTRIL) 20 MG tablet     HYDROcodone-acetaminophen (NORCO) 5-325 MG tablet     lisinopril (ZESTRIL) 40 MG tablet     Sig: Take 1 tablet (40 mg) by mouth daily     Dispense:  30 tablet     Refill:  1     Orders Placed This Encounter   Procedures     Lipid panel reflex to direct LDL Fasting     JUST IN CASE     Albumin Random Urine Quantitative with Creat Ratio     Comprehensive metabolic panel     TSH with free T4 reflex     Prostate spec antigen screen     GASTROENTEROLOGY ADULT REF PROCEDURE ONLY       3. Patient needs to follow up in if no improvement,or sooner if worsening of symptoms or other symptoms develop.  CONSULTATION/REFERRAL to colonoscopy   Please call [phone] to make appointment  if you do not hear from referrals in the next few days.   Will follow up and/or notify patient of  results via My Chart to determine further need for followup         Tobacco Cessation:   reports that he has been smoking cigarettes. He has a 12.50 pack-year smoking history. He has never used smokeless tobacco.  Tobacco Cessation Action Plan: Information offered: Patient not interested at this time    BMI:   Estimated body mass index is 30.27 kg/m  as calculated from the following:    Height as of this encounter: 1.753 m (5' 9\").    Weight as of this encounter: 93 kg (205 lb).   Weight management plan: Discussed healthy diet and exercise guidelines    See Patient Instructions      No follow-ups on file.    [facility]"

## 2021-06-04 ENCOUNTER — OFFICE VISIT (OUTPATIENT)
Dept: FAMILY MEDICINE | Facility: CLINIC | Age: 66
End: 2021-06-04
Payer: COMMERCIAL

## 2021-06-04 VITALS
BODY MASS INDEX: 30.36 KG/M2 | HEART RATE: 73 BPM | SYSTOLIC BLOOD PRESSURE: 174 MMHG | WEIGHT: 205 LBS | HEIGHT: 69 IN | OXYGEN SATURATION: 96 % | TEMPERATURE: 97.4 F | DIASTOLIC BLOOD PRESSURE: 96 MMHG | RESPIRATION RATE: 16 BRPM

## 2021-06-04 DIAGNOSIS — Z13.29 SCREENING FOR THYROID DISORDER: ICD-10-CM

## 2021-06-04 DIAGNOSIS — M54.2 CERVICAL PAIN: Primary | ICD-10-CM

## 2021-06-04 DIAGNOSIS — R79.89 ELEVATED LFTS: ICD-10-CM

## 2021-06-04 DIAGNOSIS — I10 HTN, GOAL BELOW 140/90: ICD-10-CM

## 2021-06-04 DIAGNOSIS — Z13.6 CARDIOVASCULAR SCREENING; LDL GOAL LESS THAN 160: ICD-10-CM

## 2021-06-04 DIAGNOSIS — Z12.11 SCREEN FOR COLON CANCER: ICD-10-CM

## 2021-06-04 DIAGNOSIS — Z12.5 SCREENING FOR PROSTATE CANCER: ICD-10-CM

## 2021-06-04 DIAGNOSIS — Z13.1 SCREENING FOR DIABETES MELLITUS (DM): ICD-10-CM

## 2021-06-04 LAB
ALBUMIN SERPL-MCNC: 4 G/DL (ref 3.4–5)
ALP SERPL-CCNC: 53 U/L (ref 40–150)
ALT SERPL W P-5'-P-CCNC: 89 U/L (ref 0–70)
ANION GAP SERPL CALCULATED.3IONS-SCNC: 5 MMOL/L (ref 3–14)
AST SERPL W P-5'-P-CCNC: 43 U/L (ref 0–45)
BILIRUB SERPL-MCNC: 0.6 MG/DL (ref 0.2–1.3)
BUN SERPL-MCNC: 16 MG/DL (ref 7–30)
CALCIUM SERPL-MCNC: 9.1 MG/DL (ref 8.5–10.1)
CHLORIDE SERPL-SCNC: 102 MMOL/L (ref 94–109)
CHOLEST SERPL-MCNC: 182 MG/DL
CO2 SERPL-SCNC: 28 MMOL/L (ref 20–32)
CREAT SERPL-MCNC: 0.92 MG/DL (ref 0.66–1.25)
CREAT UR-MCNC: 72 MG/DL
GFR SERPL CREATININE-BSD FRML MDRD: 86 ML/MIN/{1.73_M2}
GLUCOSE SERPL-MCNC: 100 MG/DL (ref 70–99)
HDLC SERPL-MCNC: 60 MG/DL
LDLC SERPL CALC-MCNC: 97 MG/DL
MICROALBUMIN UR-MCNC: 12 MG/L
MICROALBUMIN/CREAT UR: 16.99 MG/G CR (ref 0–17)
NONHDLC SERPL-MCNC: 122 MG/DL
POTASSIUM SERPL-SCNC: 4.5 MMOL/L (ref 3.4–5.3)
PROT SERPL-MCNC: 7.2 G/DL (ref 6.8–8.8)
PSA SERPL-ACNC: 1.92 UG/L (ref 0–4)
SODIUM SERPL-SCNC: 135 MMOL/L (ref 133–144)
TRIGL SERPL-MCNC: 126 MG/DL
TSH SERPL DL<=0.005 MIU/L-ACNC: 2.79 MU/L (ref 0.4–4)

## 2021-06-04 PROCEDURE — 84443 ASSAY THYROID STIM HORMONE: CPT | Performed by: NURSE PRACTITIONER

## 2021-06-04 PROCEDURE — 36415 COLL VENOUS BLD VENIPUNCTURE: CPT | Performed by: NURSE PRACTITIONER

## 2021-06-04 PROCEDURE — 80061 LIPID PANEL: CPT | Performed by: NURSE PRACTITIONER

## 2021-06-04 PROCEDURE — 80053 COMPREHEN METABOLIC PANEL: CPT | Performed by: NURSE PRACTITIONER

## 2021-06-04 PROCEDURE — G0103 PSA SCREENING: HCPCS | Performed by: NURSE PRACTITIONER

## 2021-06-04 PROCEDURE — 99204 OFFICE O/P NEW MOD 45 MIN: CPT | Performed by: NURSE PRACTITIONER

## 2021-06-04 PROCEDURE — 82043 UR ALBUMIN QUANTITATIVE: CPT | Performed by: NURSE PRACTITIONER

## 2021-06-04 RX ORDER — HYDROCODONE BITARTRATE AND ACETAMINOPHEN 5; 325 MG/1; MG/1
TABLET ORAL
COMMUNITY
Start: 2021-05-28 | End: 2021-08-10

## 2021-06-04 RX ORDER — LISINOPRIL 20 MG/1
TABLET ORAL
COMMUNITY
Start: 2021-05-27 | End: 2021-06-04

## 2021-06-04 RX ORDER — LISINOPRIL 40 MG/1
40 TABLET ORAL DAILY
Qty: 30 TABLET | Refills: 1 | Status: SHIPPED | OUTPATIENT
Start: 2021-06-04 | End: 2021-08-03

## 2021-06-04 ASSESSMENT — MIFFLIN-ST. JEOR: SCORE: 1700.25

## 2021-06-04 NOTE — PATIENT INSTRUCTIONS
PLAN:   1.   Symptomatic therapy suggested: increase lisinopril to 40 mg a day.  2.  Orders Placed This Encounter   Medications     DISCONTD: lisinopril (ZESTRIL) 20 MG tablet     HYDROcodone-acetaminophen (NORCO) 5-325 MG tablet     lisinopril (ZESTRIL) 40 MG tablet     Sig: Take 1 tablet (40 mg) by mouth daily     Dispense:  30 tablet     Refill:  1     Orders Placed This Encounter   Procedures     Lipid panel reflex to direct LDL Fasting     JUST IN CASE     Albumin Random Urine Quantitative with Creat Ratio     Comprehensive metabolic panel     TSH with free T4 reflex     Prostate spec antigen screen     GASTROENTEROLOGY ADULT REF PROCEDURE ONLY       3. Patient needs to follow up in if no improvement,or sooner if worsening of symptoms or other symptoms develop.  CONSULTATION/REFERRAL to colonoscopy   Please call [phone] to make appointment  if you do not hear from referrals in the next few days.   Will follow up and/or notify patient of  results via My Chart to determine further need for followup

## 2021-06-06 NOTE — RESULT ENCOUNTER NOTE
Allie Wilkins,    Attached are your test results.  -PSA (prostate specific antigen) test is normal.  -Cholesterol levels (LDL,HDL, Triglycerides) are normal.  ADVISE: rechecking in 1 year.   -Liver and gallbladder tests (ALT,AST, Alk phos,bilirubin) slightly elevated   Recheck in 1 month not fasting   -Kidney function (GFR) is normal.  -Sodium is normal.  -Potassium is normal.  -Calcium is normal.  -Glucose (diabetic screening test) is normal.  -TSH (thyroid stimulating hormone) level is normal which indicates normal thyroid function.  -Microalbumin (urine protein) test is normal.  ADVISE: rechecking this annually.   Please contact us if you have any questions.    Izabela Moulton, CNP

## 2021-06-10 DIAGNOSIS — Z11.59 ENCOUNTER FOR SCREENING FOR OTHER VIRAL DISEASES: ICD-10-CM

## 2021-06-29 RX ORDER — SODIUM, POTASSIUM,MAG SULFATES 17.5-3.13G
2 SOLUTION, RECONSTITUTED, ORAL ORAL SEE ADMIN INSTRUCTIONS
Qty: 354 ML | Refills: 0 | Status: SHIPPED | OUTPATIENT
Start: 2021-06-29 | End: 2021-08-10

## 2021-06-29 RX ORDER — BISACODYL 5 MG/1
5 TABLET, DELAYED RELEASE ORAL SEE ADMIN INSTRUCTIONS
Qty: 1 TABLET | Refills: 0 | Status: SHIPPED | OUTPATIENT
Start: 2021-06-29 | End: 2021-08-10

## 2021-07-01 DIAGNOSIS — Z11.59 ENCOUNTER FOR SCREENING FOR OTHER VIRAL DISEASES: ICD-10-CM

## 2021-07-22 ASSESSMENT — MIFFLIN-ST. JEOR: SCORE: 1677.57

## 2021-07-26 ENCOUNTER — LAB (OUTPATIENT)
Dept: URGENT CARE | Facility: URGENT CARE | Age: 66
End: 2021-07-26
Attending: SURGERY
Payer: COMMERCIAL

## 2021-07-26 DIAGNOSIS — Z11.59 ENCOUNTER FOR SCREENING FOR OTHER VIRAL DISEASES: ICD-10-CM

## 2021-07-26 LAB — SARS-COV-2 RNA RESP QL NAA+PROBE: NEGATIVE

## 2021-07-26 PROCEDURE — U0005 INFEC AGEN DETEC AMPLI PROBE: HCPCS

## 2021-07-26 PROCEDURE — U0003 INFECTIOUS AGENT DETECTION BY NUCLEIC ACID (DNA OR RNA); SEVERE ACUTE RESPIRATORY SYNDROME CORONAVIRUS 2 (SARS-COV-2) (CORONAVIRUS DISEASE [COVID-19]), AMPLIFIED PROBE TECHNIQUE, MAKING USE OF HIGH THROUGHPUT TECHNOLOGIES AS DESCRIBED BY CMS-2020-01-R: HCPCS

## 2021-07-30 ENCOUNTER — HOSPITAL ENCOUNTER (OUTPATIENT)
Facility: AMBULATORY SURGERY CENTER | Age: 66
Discharge: HOME | End: 2021-07-30
Attending: INTERNAL MEDICINE | Admitting: INTERNAL MEDICINE
Payer: COMMERCIAL

## 2021-07-30 VITALS
OXYGEN SATURATION: 95 % | DIASTOLIC BLOOD PRESSURE: 97 MMHG | RESPIRATION RATE: 16 BRPM | BODY MASS INDEX: 29.62 KG/M2 | SYSTOLIC BLOOD PRESSURE: 164 MMHG | WEIGHT: 200 LBS | HEIGHT: 69 IN | TEMPERATURE: 97.2 F | HEART RATE: 77 BPM

## 2021-07-30 DIAGNOSIS — Z12.11 SCREEN FOR COLON CANCER: Primary | ICD-10-CM

## 2021-07-30 LAB — COLONOSCOPY: NORMAL

## 2021-07-30 PROCEDURE — 45385 COLONOSCOPY W/LESION REMOVAL: CPT

## 2021-07-30 PROCEDURE — 88305 TISSUE EXAM BY PATHOLOGIST: CPT | Performed by: PATHOLOGY

## 2021-07-30 PROCEDURE — G8918 PT W/O PREOP ORDER IV AB PRO: HCPCS

## 2021-07-30 PROCEDURE — G8907 PT DOC NO EVENTS ON DISCHARG: HCPCS

## 2021-07-30 RX ORDER — ONDANSETRON 2 MG/ML
4 INJECTION INTRAMUSCULAR; INTRAVENOUS EVERY 6 HOURS PRN
Status: DISCONTINUED | OUTPATIENT
Start: 2021-07-30 | End: 2021-07-31 | Stop reason: HOSPADM

## 2021-07-30 RX ORDER — NALOXONE HYDROCHLORIDE 0.4 MG/ML
0.2 INJECTION, SOLUTION INTRAMUSCULAR; INTRAVENOUS; SUBCUTANEOUS
Status: DISCONTINUED | OUTPATIENT
Start: 2021-07-30 | End: 2021-07-31 | Stop reason: HOSPADM

## 2021-07-30 RX ORDER — PROCHLORPERAZINE MALEATE 5 MG
5 TABLET ORAL EVERY 6 HOURS PRN
Status: DISCONTINUED | OUTPATIENT
Start: 2021-07-30 | End: 2021-07-31 | Stop reason: HOSPADM

## 2021-07-30 RX ORDER — ONDANSETRON 4 MG/1
4 TABLET, ORALLY DISINTEGRATING ORAL EVERY 6 HOURS PRN
Status: DISCONTINUED | OUTPATIENT
Start: 2021-07-30 | End: 2021-07-31 | Stop reason: HOSPADM

## 2021-07-30 RX ORDER — FENTANYL CITRATE 50 UG/ML
INJECTION, SOLUTION INTRAMUSCULAR; INTRAVENOUS PRN
Status: DISCONTINUED | OUTPATIENT
Start: 2021-07-30 | End: 2021-07-30 | Stop reason: HOSPADM

## 2021-07-30 RX ORDER — ONDANSETRON 2 MG/ML
4 INJECTION INTRAMUSCULAR; INTRAVENOUS
Status: DISCONTINUED | OUTPATIENT
Start: 2021-07-30 | End: 2021-07-31 | Stop reason: HOSPADM

## 2021-07-30 RX ORDER — LIDOCAINE 40 MG/G
CREAM TOPICAL
Status: DISCONTINUED | OUTPATIENT
Start: 2021-07-30 | End: 2021-07-31 | Stop reason: HOSPADM

## 2021-07-30 RX ORDER — NALOXONE HYDROCHLORIDE 0.4 MG/ML
0.4 INJECTION, SOLUTION INTRAMUSCULAR; INTRAVENOUS; SUBCUTANEOUS
Status: DISCONTINUED | OUTPATIENT
Start: 2021-07-30 | End: 2021-07-31 | Stop reason: HOSPADM

## 2021-07-30 RX ORDER — FLUMAZENIL 0.1 MG/ML
0.2 INJECTION, SOLUTION INTRAVENOUS
Status: ACTIVE | OUTPATIENT
Start: 2021-07-30 | End: 2021-07-30

## 2021-08-01 DIAGNOSIS — I10 HTN, GOAL BELOW 140/90: ICD-10-CM

## 2021-08-01 NOTE — LETTER
August 3, 2021    Aamir Wilkins  [address]  [place] [facility] [id number]    Dear Aamir,       We recently received a refill request for lisinopril (ZESTRIL) 40 MG tablet.  We have refilled this for a one time 30 day supply only because you are due for a:    Blood pressure/medication check office visit      Please call at your earliest convenience so that there will not be a delay with your future refills.          Thank you,   Your [facility] Team/  [phone]

## 2021-08-03 LAB
PATH REPORT.COMMENTS IMP SPEC: NORMAL
PATH REPORT.COMMENTS IMP SPEC: NORMAL
PATH REPORT.FINAL DX SPEC: NORMAL
PATH REPORT.GROSS SPEC: NORMAL
PATH REPORT.MICROSCOPIC SPEC OTHER STN: NORMAL
PATH REPORT.RELEVANT HX SPEC: NORMAL
PHOTO IMAGE: NORMAL

## 2021-08-03 RX ORDER — LISINOPRIL 40 MG/1
40 TABLET ORAL DAILY
Qty: 30 TABLET | Refills: 1 | Status: SHIPPED | OUTPATIENT
Start: 2021-08-03 | End: 2021-08-10

## 2021-08-03 RX ORDER — LISINOPRIL 40 MG/1
TABLET ORAL
Qty: 30 TABLET | Refills: 1 | OUTPATIENT
Start: 2021-08-03

## 2021-08-03 NOTE — TELEPHONE ENCOUNTER
Routing refill request to provider for review/approval because:  BP Readings from Last 6 Encounters:   07/30/21 (!) (P) 165/100   06/04/21 (!) 174/96   08/08/18 125/86   04/10/18 135/82   01/17/18 132/87   12/26/17 126/72     Margret Wei RN

## 2021-08-03 NOTE — TELEPHONE ENCOUNTER
I have not seen this patient since 2017  Unable to refill medication  Routing to Cheryl to review the refill and follow-up

## 2021-08-04 ENCOUNTER — TELEPHONE (OUTPATIENT)
Dept: FAMILY MEDICINE | Facility: CLINIC | Age: 66
End: 2021-08-04

## 2021-08-04 NOTE — TELEPHONE ENCOUNTER
Reason for Call:  Medication or medication refill:    Do you use a [facility]?  Name of the pharmacy and phone number for the current request:      [facility]Y #[id number] - [facility] [place] [place], [place] - [address]    Name of the medication requested:     lisinopril (ZESTRIL) 40 MG tablet    Other request:Pt has appt set for med recheck but will run out of medication prior to appt.     Can we leave a detailed message on this number? YES    Phone number patient can be reached at: Home number on file [phone] (home)    Best Time: any      Call taken on 8/4/2021 at 9:07 AM by Noé Ceballos

## 2021-08-09 NOTE — PROGRESS NOTES
Caio Rutledge is a 65 year old who presents for the following health issues     History of Present Illness       Hypertension: He presents for follow up of hypertension.  He does not check blood pressure  regularly outside of the clinic. Outside blood pressures have been over 140/90. He does not follow a low salt diet.     He eats 2-3 servings of fruits and vegetables daily.He consumes 2 sweetened beverage(s) daily.He exercises with enough effort to increase his heart rate 10 to 19 minutes per day.  He exercises with enough effort to increase his heart rate 5 days per week.   He is taking medications regularly.       -pt is here for a BP check, is high at the chiropractor every three weeks     Hypertension Follow-up      Do you check your blood pressure regularly outside of the clinic? Yes     Are you following a low salt diet? Yes    Are your blood pressures ever more than 140 on the top number (systolic) OR more   than 90 on the bottom number (diastolic), for example 140/90? Yes    How many servings of fruits a  Lab work is in process  Labs reviewed in EPIC  BP Readings from Last 3 Encounters:   08/10/21 (!) 142/84   07/30/21 (!) (P) 165/100   06/04/21 (!) 174/96    Wt Readings from Last 3 Encounters:   08/10/21 89.3 kg (196 lb 12.8 oz)   07/22/21 90.7 kg (200 lb)   06/04/21 93 kg (205 lb)                  Patient Active Problem List   Diagnosis     Advanced directives, counseling/discussion     Hyperlipidemia LDL goal <130     Obesity (BMI 30-39.9)     Tobacco use disorder     Elevated fasting glucose     Erectile dysfunction, unspecified erectile dysfunction type     Vitamin D deficiency     Muscle cramp, nocturnal     WILL (obstructive sleep apnea)- mild (AHI 7)     Past Surgical History:   Procedure Laterality Date     COLONOSCOPY WITH CO2 INSUFFLATION N/A 7/30/2021    Procedure: COLONOSCOPY, WITH CO2 INSUFFLATION;  Surgeon: Albania Puentes DO;  Location: [facility] OR     HERNIA REPAIR  1974    age  "17--right     LAPAROTOMY EXPLORATORY  1998    RLQ abcess     ROTATOR CUFF REPAIR RT/LT  2002    left        Social History     Tobacco Use     Smoking status: Current Every Day Smoker     Packs/day: 0.50     Years: 25.00     Pack years: 12.50     Types: Cigarettes     Smokeless tobacco: Never Used   Substance Use Topics     Alcohol use: Yes     Comment: 2-3 beers/day      Family History   Problem Relation Age of Onset     Unknown/Adopted Daughter      Hypertension Mother      Eye Disorder Mother         cataracts, glucoma      Diabetes No family hx of      Coronary Artery Disease No family hx of      Colon Cancer No family hx of          Current Outpatient Medications   Medication Sig Dispense Refill     IBUPROFEN PO Take by mouth as needed for moderate pain       lisinopril (ZESTRIL) 40 MG tablet Take 1 tablet (40 mg) by mouth daily 30 tablet 1     No Known Allergies      Review of Systems   CONSTITUTIONAL:NEGATIVE for fever, chills, change in weight  ENT/MOUTH: NEGATIVE for ear, mouth and throat problems  RESP:NEGATIVE for significant cough or SOB  CV: NEGATIVE for chest pain, palpitations or peripheral edema  GI: NEGATIVE for nausea, abdominal pain, heartburn, or change in bowel habits  MUSCULOSKELETAL: NEGATIVE for significant arthralgias or myalgia  NEURO: NEGATIVE for weakness, dizziness or paresthesias  ENDOCRINE: NEGATIVE for temperature intolerance, skin/hair changes  PSYCHIATRIC: NEGATIVE for changes in mood or affect      Objective    BP (!) 142/84   Pulse 79   Temp 96.8  F (36  C) (Tympanic)   Resp 12   Ht 1.727 m (5' 8\")   Wt 89.3 kg (196 lb 12.8 oz)   SpO2 94%   BMI 29.92 kg/m    Body mass index is 29.92 kg/m .   BP Readings from Last 6 Encounters:   08/10/21 (!) 142/84   07/30/21 (!) (P) 165/100   06/04/21 (!) 174/96   08/08/18 125/86   04/10/18 135/82   01/17/18 132/87     Wt Readings from Last 4 Encounters:   08/10/21 89.3 kg (196 lb 12.8 oz)   07/22/21 90.7 kg (200 lb)   06/04/21 93 kg (205 " lb)   08/08/18 93 kg (205 lb)       Physical Exam   GENERAL: Patient is well nourished, well developed,in no apparent distress, non-toxic, in no respiratory distress and acyanotic, alert, cooperative and well hydrated  EYES: Eyes grossly normal to inspection and conjunctivae and sclerae normal  HENT:ear canals and TM's normal and nose and mouth without ulcers or lesions   NECK:normal and supple  CARDIAC:regular rates and rhythm  without LE edema bilaterally  RESP: normal respiratory rate and rhythm, lungs clear to auscultation  ABD:soft, nontender, without hepatosplenomegaly or masses  SKIN: Skin color, texture, turgor normal. No rashes or lesions.  MS: extremities normal- no gross deformities noted, gait normal and normal muscle tone  NEURO: Normal strength and tone, sensory exam grossly normal, mentation intact and speech normal  PSYCH: Alert, oriented, thought content appropriate,mentation appears normal., affect and mood normal    Results for orders placed or performed in visit on 08/10/21   Hepatic panel (Albumin, ALT, AST, Bili, Alk Phos, TP)     Status: Normal   Result Value Ref Range    Bilirubin Total 0.8 0.2 - 1.3 mg/dL    Bilirubin Direct 0.2 0.0 - 0.2 mg/dL    Protein Total 7.6 6.8 - 8.8 g/dL    Albumin 4.1 3.4 - 5.0 g/dL    Alkaline Phosphatase 58 40 - 150 U/L    AST 39 0 - 45 U/L    ALT 64 0 - 70 U/L      Assessment & Plan     HTN, goal below 140/90  HTN Plan:  1)  Medication: add amlodipine   2)  Dietary sodium restriction  3)  Regular aerobic exercise  4)  Recheck in 2 weeks for BP check , sooner should new symptoms or   problems arise.  5) See todays orders.    Patient Education: Reviewed risks of hypertension and principles of   treatment.  - lisinopril (ZESTRIL) 40 MG tablet  Dispense: 90 tablet; Refill: 3  - amLODIPine (NORVASC) 5 MG tablet  Dispense: 30 tablet; Refill: 1    Hyperlipidemia LDL goal <130  Discussed the goals for treatment.  Will try diet and exercise  and then recheck  lipids.    WILL (obstructive sleep apnea)- mild (AHI 7)  Continue current medications as prescribed.     Elevated LFTs  Will repeat not fasting   - Hepatic panel (Albumin, ALT, AST, Bili, Alk Phos, TP)  - Hepatic panel (Albumin, ALT, AST, Bili, Alk Phos, TP)    Screening for AAA (abdominal aortic aneurysm)  - Abdominal Aortic Aneurysm Screening/Tracking  - US Abdominal Aorta Limited    History of smoking  - US Abdominal Aorta Limited    Ectatic abdominal aorta (H)  - Adult Cardiology Eval Referral       See Patient Instructions  Patient Instructions     PLAN:   1.   Symptomatic therapy suggested: continue lisinopril   Add on amlodipine once a day  2.  Orders Placed This Encounter   Medications     lisinopril (ZESTRIL) 40 MG tablet     Sig: Take 1 tablet (40 mg) by mouth daily     Dispense:  90 tablet     Refill:  3     amLODIPine (NORVASC) 5 MG tablet     Sig: Take 1 tablet (5 mg) by mouth daily     Dispense:  30 tablet     Refill:  1     Orders Placed This Encounter   Procedures     REVIEW OF HEALTH MAINTENANCE PROTOCOL ORDERS     Abdominal Aortic Aneurysm Screening/Tracking     US Abdominal Aorta Limited     Hepatic panel (Albumin, ALT, AST, Bili, Alk Phos, TP)       3. Patient needs to follow up in if no improvement,or sooner if worsening of symptoms or other symptoms develop.  Screening for abdominal   I will place order. Please call [phone] to schedule.  Will follow up and/or notify patient of  results via My Chart to determine further need for followup  Make appointment for blood pressure check only at pharmacy in 2 weeks        No follow-ups on file.    [facility]

## 2021-08-10 ENCOUNTER — OFFICE VISIT (OUTPATIENT)
Dept: FAMILY MEDICINE | Facility: CLINIC | Age: 66
End: 2021-08-10
Payer: COMMERCIAL

## 2021-08-10 VITALS
TEMPERATURE: 96.8 F | SYSTOLIC BLOOD PRESSURE: 142 MMHG | WEIGHT: 196.8 LBS | HEART RATE: 79 BPM | HEIGHT: 68 IN | DIASTOLIC BLOOD PRESSURE: 84 MMHG | RESPIRATION RATE: 12 BRPM | OXYGEN SATURATION: 94 % | BODY MASS INDEX: 29.83 KG/M2

## 2021-08-10 DIAGNOSIS — E78.5 HYPERLIPIDEMIA LDL GOAL <130: ICD-10-CM

## 2021-08-10 DIAGNOSIS — Z87.891 HISTORY OF SMOKING: ICD-10-CM

## 2021-08-10 DIAGNOSIS — R79.89 ELEVATED LFTS: ICD-10-CM

## 2021-08-10 DIAGNOSIS — G47.33 OSA (OBSTRUCTIVE SLEEP APNEA): Chronic | ICD-10-CM

## 2021-08-10 DIAGNOSIS — Z13.6 SCREENING FOR AAA (ABDOMINAL AORTIC ANEURYSM): ICD-10-CM

## 2021-08-10 DIAGNOSIS — I10 HTN, GOAL BELOW 140/90: Primary | ICD-10-CM

## 2021-08-10 DIAGNOSIS — I77.811 ECTATIC ABDOMINAL AORTA (H): ICD-10-CM

## 2021-08-10 LAB
ALBUMIN SERPL-MCNC: 4.1 G/DL (ref 3.4–5)
ALP SERPL-CCNC: 58 U/L (ref 40–150)
ALT SERPL W P-5'-P-CCNC: 64 U/L (ref 0–70)
AST SERPL W P-5'-P-CCNC: 39 U/L (ref 0–45)
BILIRUB DIRECT SERPL-MCNC: 0.2 MG/DL (ref 0–0.2)
BILIRUB SERPL-MCNC: 0.8 MG/DL (ref 0.2–1.3)
PROT SERPL-MCNC: 7.6 G/DL (ref 6.8–8.8)

## 2021-08-10 PROCEDURE — 99214 OFFICE O/P EST MOD 30 MIN: CPT | Performed by: NURSE PRACTITIONER

## 2021-08-10 PROCEDURE — 80076 HEPATIC FUNCTION PANEL: CPT | Performed by: NURSE PRACTITIONER

## 2021-08-10 PROCEDURE — 36415 COLL VENOUS BLD VENIPUNCTURE: CPT | Performed by: NURSE PRACTITIONER

## 2021-08-10 RX ORDER — LISINOPRIL 40 MG/1
40 TABLET ORAL DAILY
Qty: 90 TABLET | Refills: 3 | Status: SHIPPED | OUTPATIENT
Start: 2021-08-10 | End: 2022-08-31

## 2021-08-10 RX ORDER — AMLODIPINE BESYLATE 5 MG/1
5 TABLET ORAL DAILY
Qty: 30 TABLET | Refills: 1 | Status: SHIPPED | OUTPATIENT
Start: 2021-08-10 | End: 2021-09-01

## 2021-08-10 ASSESSMENT — PAIN SCALES - GENERAL: PAINLEVEL: NO PAIN (0)

## 2021-08-10 ASSESSMENT — MIFFLIN-ST. JEOR: SCORE: 1647.18

## 2021-08-10 NOTE — RESULT ENCOUNTER NOTE
Allie Wilkins,    Attached are your test results.  -Liver and gallbladder tests (ALT,AST, Alk phos,bilirubin) are normal.   Please contact us if you have any questions.    Izabela Moulton, CNP

## 2021-08-10 NOTE — PATIENT INSTRUCTIONS
PLAN:   1.   Symptomatic therapy suggested: continue lisinopril   Add on amlodipine once a day  2.  Orders Placed This Encounter   Medications     lisinopril (ZESTRIL) 40 MG tablet     Sig: Take 1 tablet (40 mg) by mouth daily     Dispense:  90 tablet     Refill:  3     amLODIPine (NORVASC) 5 MG tablet     Sig: Take 1 tablet (5 mg) by mouth daily     Dispense:  30 tablet     Refill:  1     Orders Placed This Encounter   Procedures     REVIEW OF HEALTH MAINTENANCE PROTOCOL ORDERS     Abdominal Aortic Aneurysm Screening/Tracking     US Abdominal Aorta Limited     Hepatic panel (Albumin, ALT, AST, Bili, Alk Phos, TP)       3. Patient needs to follow up in if no improvement,or sooner if worsening of symptoms or other symptoms develop.  Screening for abdominal   I will place order. Please call [phone] to schedule.  Will follow up and/or notify patient of  results via My Chart to determine further need for followup  Make appointment for blood pressure check only at pharmacy in 2 weeks

## 2021-08-13 ENCOUNTER — ANCILLARY PROCEDURE (OUTPATIENT)
Dept: ULTRASOUND IMAGING | Facility: CLINIC | Age: 66
End: 2021-08-13
Attending: NURSE PRACTITIONER
Payer: COMMERCIAL

## 2021-08-13 DIAGNOSIS — Z13.6 SCREENING FOR AAA (ABDOMINAL AORTIC ANEURYSM): ICD-10-CM

## 2021-08-13 DIAGNOSIS — Z87.891 HISTORY OF SMOKING: ICD-10-CM

## 2021-08-13 PROCEDURE — 76775 US EXAM ABDO BACK WALL LIM: CPT | Performed by: RADIOLOGY

## 2021-08-17 NOTE — RESULT ENCOUNTER NOTE
Allie Wilkins,    Attached are your test results.  Your ultrasound shows borderline enlarged aorta but not aneurysm   So I am going to refer you to cardiology and see what they  recommend for monitoring from now on     Please call [phone] to make appointment  if you do not hear from referrals in the next few days.      Please contact us if you have any questions.    Izabela Moulton, CNP

## 2021-08-24 ENCOUNTER — ALLIED HEALTH/NURSE VISIT (OUTPATIENT)
Dept: PEDIATRICS | Facility: CLINIC | Age: 66
End: 2021-08-24
Payer: COMMERCIAL

## 2021-08-24 VITALS — DIASTOLIC BLOOD PRESSURE: 88 MMHG | HEART RATE: 79 BPM | SYSTOLIC BLOOD PRESSURE: 134 MMHG

## 2021-08-24 DIAGNOSIS — Z01.30 BLOOD PRESSURE CHECK: Primary | ICD-10-CM

## 2021-08-24 PROCEDURE — 99207 PR NO CHARGE NURSE ONLY: CPT | Performed by: FAMILY MEDICINE

## 2021-09-01 DIAGNOSIS — I10 HTN, GOAL BELOW 140/90: ICD-10-CM

## 2021-09-01 RX ORDER — AMLODIPINE BESYLATE 5 MG/1
TABLET ORAL
Qty: 30 TABLET | Refills: 1 | Status: SHIPPED | OUTPATIENT
Start: 2021-09-01 | End: 2021-09-29

## 2021-09-01 NOTE — TELEPHONE ENCOUNTER
Prescription approved per [facility] Refill Protocol.  Yuki Zabala RN, BSN   [facility] [place]

## 2021-09-29 ENCOUNTER — OFFICE VISIT (OUTPATIENT)
Dept: CARDIOLOGY | Facility: CLINIC | Age: 66
End: 2021-09-29
Payer: COMMERCIAL

## 2021-09-29 VITALS
DIASTOLIC BLOOD PRESSURE: 88 MMHG | SYSTOLIC BLOOD PRESSURE: 137 MMHG | BODY MASS INDEX: 30.43 KG/M2 | WEIGHT: 200.1 LBS | HEART RATE: 86 BPM | OXYGEN SATURATION: 96 %

## 2021-09-29 DIAGNOSIS — I77.811 ECTATIC ABDOMINAL AORTA (H): ICD-10-CM

## 2021-09-29 DIAGNOSIS — Z23 NEED FOR PROPHYLACTIC VACCINATION AND INOCULATION AGAINST INFLUENZA: Primary | ICD-10-CM

## 2021-09-29 DIAGNOSIS — I10 HTN, GOAL BELOW 140/90: ICD-10-CM

## 2021-09-29 PROCEDURE — 99205 OFFICE O/P NEW HI 60 MIN: CPT | Mod: 25 | Performed by: STUDENT IN AN ORGANIZED HEALTH CARE EDUCATION/TRAINING PROGRAM

## 2021-09-29 PROCEDURE — 90662 IIV NO PRSV INCREASED AG IM: CPT | Performed by: STUDENT IN AN ORGANIZED HEALTH CARE EDUCATION/TRAINING PROGRAM

## 2021-09-29 PROCEDURE — 90471 IMMUNIZATION ADMIN: CPT | Performed by: STUDENT IN AN ORGANIZED HEALTH CARE EDUCATION/TRAINING PROGRAM

## 2021-09-29 PROCEDURE — 93000 ELECTROCARDIOGRAM COMPLETE: CPT | Performed by: STUDENT IN AN ORGANIZED HEALTH CARE EDUCATION/TRAINING PROGRAM

## 2021-09-29 RX ORDER — AMLODIPINE BESYLATE 5 MG/1
TABLET ORAL
Qty: 90 TABLET | Refills: 0 | Status: SHIPPED | OUTPATIENT
Start: 2021-09-29 | End: 2022-01-03

## 2021-09-29 NOTE — PATIENT INSTRUCTIONS
You were seen at the [facility] Cardiology clinic today.   You saw Dr. Danny Vargas, [facility], [place].    The following is a summary of your office visit today:    1. Get ultrasound of heart to evaluate heart muscle and heart valves  2. Consider starting a statin medication to prevent growth of aorta  3. Repeat ultrasound of abdominal aorta in 3-5 years  4. Follow-up with me as needed     Nurse contact information:    Cardiology Care Coordinators: Angelina Snyder RN and Bettie Smart RN      Phone: [phone]   Fax: [phone]      HOW TO CHECK YOUR BLOOD PRESSURE AT HOME:     Avoid eating, smoking, and exercising for at least 30 minutes before taking a reading.    Be sure you have taken your BP medication at least 2-3 hours before you check it.     Sit quietly for 10 minutes before a reading.     Sit in a chair with your feet flat on the floor. Rest your  arm on a table so that the arm cuff is at the same level as your heart.    Remain still during the reading.    Record your blood pressure and pulse in a log and bring to your next appointment.     If you have had any blood work, imaging or other testing completed we will be in touch within 1-2 weeks regarding the results. If you have any questions, concerns or need to schedule a follow up, please contact us at [phone]. If you are needing refills please contact your pharmacy. For urgent after hour care please call the [facility] at [phone] or the [facility] at [phone] and ask to speak to the on-call Cardiologist.    It was a pleasure meeting with you today. Please let us know if there is anything else we can do for you so that we can be sure you are leaving completely satisfied with your care experience.     Your Cardiology Team at [facility]  RN Care Coordinators: Reji JENSEN: [place]

## 2021-09-29 NOTE — NURSING NOTE
Aamir Wilkins      1.  Has the patient received the information for the influenza vaccine? YES    2.  Does the patient have any of the following contraindications?     Allergy to eggs?  No     Allergic reaction to previous influenza vaccines?  No     Any other problems to previous influenza vaccines?  No     Paralyzed by Guillain-Woodland syndrome?  No     Currently pregnant? NO     Current moderate or severe illness?  No     Allergy to contact lens solution?  No    3.  The vaccine has been administered in the usual fashion and the patient was instructed to wait 20 minutes before leaving the building in the event of an allergic reaction: YES    Vaccination given by Hany Treadwell RN  .  Recorded by Hany Treadwell RN

## 2021-09-29 NOTE — PROGRESS NOTES
Chief Complaint: Ectatic abdominal aorta (abdominal aortic aneurysm)    HPI: Aamir Wilkins is a 66 year old male with a past medical history of hypertension, dyslipidemia, obstructive sleep apnea, and a history of smoking  who was referred to me for evaluation of an ectatic aorta by Izabela Moulton NP.     Briefly, Mr. Wilkins underwent routine abdominal aortic screening given his active smoking and his age.  This was done in August 2021 was notable for him having of the proximal abdominal aorta that measured 2.6 cm, which was qualified as being ectatic.  After Mr. Wilkins underwent the screening, he was referred to Cardiology for further evaluation of this abdominal aortic aneurysm.    At the time of the consultation, Mr. Wilkins  notes an absence of abdominal pain, chest pain at rest, dyspnea at rest or with exertion, PND, orthopnea, peripheral edema, palpitations, lightheadedness or syncope.  He measures his blood pressure at home and typically gets systolic values in the 130s. 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 No leg cramping.    A comprehensive ROS was done and the details are included above in the HPI.    Past Medical History:  - Hypertension  - No prior history of T2DM, dyslipidemia, CAD, TIA/stroke, vascular aneurysms, PAD  Past Medical History:   Diagnosis Date     Hypertension      NO ACTIVE PROBLEMS        Past Surgical History:    Past Surgical History:   Procedure Laterality Date     COLONOSCOPY WITH CO2 INSUFFLATION N/A 7/30/2021    Procedure: COLONOSCOPY, WITH CO2 INSUFFLATION;  Surgeon: Albania Puentes DO;  Location: [facility] OR     HERNIA REPAIR  1974    age 17--right     LAPAROTOMY EXPLORATORY  1998    RLQ abcess     ROTATOR CUFF REPAIR RT/LT  2002    left        Drug History:  Home cardiac meds: Amlodipine 5 mg q24h, lisinopril 40 mg q24h   Current Outpatient Medications   Medication Sig Dispense Refill     amLODIPine  (NORVASC) 5 MG tablet TAKE 1 TABLET BY MOUTH EVERY DAY 30 tablet 1     IBUPROFEN PO Take by mouth as needed for moderate pain       lisinopril (ZESTRIL) 40 MG tablet Take 1 tablet (40 mg) by mouth daily 90 tablet 3         Family History:  - No family history of sudden cardiac death, premature CAD, valvular disorders, aneurysms of aorta or abdomen  Family History   Problem Relation Age of Onset     Unknown/Adopted Daughter      Hypertension Mother      Eye Disorder Mother         cataracts, glucoma      Diabetes No family hx of      Coronary Artery Disease No family hx of      Colon Cancer No family hx of        Social History:  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    /88    GENERAL: Healthy, alert and no distress  Eyes: No xanthelasmas.  NECK: No palpable neck masses/goitre and no evidence of retrosternal goitre.   ENT: Moist mucosal membranes.  RESPIRATORY: No signs of resp distress. Lungs CTAB.  CARDIOVASCULAR:  No JVD, regular, normal S1+S2 without added sounds or murmurs.  ABDOMEN: ND, soft, non-tender, normal bowel sounds. No pulsatile/expansile masses.  EXTREMITIES: Warm, well-perfused, no edema.   NEUROLOGY: GCS 15/15, no focal deficits.  VASC: No carotid bruits. Carotid, radial, brachial, popliteal, dorsalis pedis and posterior tibialis pulses are normal in volumes and symmetric bilaterally.   SKIN: No ecchymoses, no rashes.  PSYCH: Cooperative, pleasant affect.        Investigations:  I personally viewed and interpreted the following investigations:    Labs:    CBC RESULTS:  Lab Results   Component Value Date    WBC 10.4 12/26/2017    RBC 4.97 12/26/2017    HGB 15.9 12/26/2017    HCT 46.1 12/26/2017    MCV 93 12/26/2017    MCH 32.0 12/26/2017    MCHC 34.5 12/26/2017    RDW 12.0 12/26/2017     12/26/2017       CMP RESULTS:  Lab Results   Component Value Date     06/04/2021    POTASSIUM 4.5 06/04/2021    CHLORIDE 102 06/04/2021    CO2 28 06/04/2021    ANIONGAP 5 06/04/2021     (H) 06/04/2021    BUN 16 06/04/2021    CR 0.92 06/04/2021    GFRESTIMATED 86 06/04/2021    GFRESTBLACK >90 06/04/2021    MICHELLE 9.1 06/04/2021    BILITOTAL 0.8 08/10/2021    BILITOTAL 0.6 06/04/2021    ALBUMIN 4.1 08/10/2021    ALBUMIN 4.0 06/04/2021    ALKPHOS 58 08/10/2021    ALKPHOS 53 06/04/2021    ALT 64 08/10/2021    ALT 89 (H) 06/04/2021    AST 39 08/10/2021    AST 43 06/04/2021          LIPIDS:  Lab Results   Component Value Date    CHOL 182 06/04/2021     Lab Results   Component Value Date    HDL 60 06/04/2021     Lab Results   Component Value Date    LDL 97 06/04/2021     Lab Results   Component Value Date    TRIG 126 06/04/2021       Recent Tests:    Electrocardiogram (09/29/2021):  NSR; LAD; pathologic Q waves in II, III, aVF    US Abdominal Aorta (08/13/2021):  FINDINGS: Examination of the abdominal aorta is performed. Examination is mildly limited due to overlying bowel gas.  Proximal abdominal aorta: 2.6 cm.  Mid abdominal aorta: 2.5 cm.  Distal abdominal aorta: 2.4 cm.  Proximal right common iliac artery is normal in caliber measuring 1.4 cm in maximal diameter.     Proximal left common iliac artery is normal in caliber measuring 1.3 cm in maximal diameter.                                                                IMPRESSION:  Mildly ectatic aorta without evidence of an abdominal aortic aneurysm.      Assessment and Plan:   Aamir JANES Franky is a 66 year old male  with a past medical history of hypertension, dyslipidemia, and former smoking who was referred to me for evaluation of his abdominal aortic aneurysm in September 2021.     Given the size of Mr. Wilkins's abdominal aortic aneurysm, the mainstays of his management include risk factor control and periodic surveillance.  I spoke to him extensively about smoking cessation, but he noted that he is not ready to pursue this yet.  His blood pressure does not warrant any changes today since it is <140/90 (given that he is not a diabetic), but I would recommend increasing his amlodipine to 10 mg q24h should that be necessary.  I also spoke to him about the merits and drawbacks of starting statin therapy given that he has established ASCVD with this AAA.  After our discussion, he said that he wanted to discuss this with his primary care clinician, Ms. zIabela Moulton before starting.  I see his rationale and I would recommend a high-intensity regimen composed of daily atorvastatin 40 to 80 mg or rosuvastatin 20 to 40 mg, should he decide in favour of the statin therapy.    Separately, his EKG is notable for inferior Q waves.  While he does not describe any history consistent with a prior myocardial infarction, I talked about seeking an echocardiogram to evaluate his global left ventricular function and regional wall motion.  He also said that he wanted to discuss this with Ms. Izabela Moulton before pursuing it.    Problems:  1. Ectatic abdominal aorta  2. Hypertension  3. Dyslipidemia  4. Former smoking  5. Inferior Q waves on EKG     Plan:  - Repeat abdominal ultrasound in 2-4 years  - Continue current blood pressure regimen   - Consider high-intensity statin, as above  - TTE to evaluate inferior Q waves on EKG   - RTC PRN    Chart review time: 20  Visit time: 25  Total time spent: 45  >50% of this 45-minute visit were spent with the patient on in-person counseling and discussion regarding AAA, hypertension, smoking.     Danny  Alicia [facility], [place]    Cardiovascular Division  Pager [phone]    CC  Patient Care Team:  No Ref-Primary, Physician as PCP - Izabela Spears APRN CNP as Assigned PCP

## 2022-05-14 ENCOUNTER — HEALTH MAINTENANCE LETTER (OUTPATIENT)
Age: 67
End: 2022-05-14

## 2022-06-29 DIAGNOSIS — I10 HTN, GOAL BELOW 140/90: ICD-10-CM

## 2022-06-29 RX ORDER — AMLODIPINE BESYLATE 5 MG/1
5 TABLET ORAL DAILY
Qty: 90 TABLET | Refills: 1 | Status: SHIPPED | OUTPATIENT
Start: 2022-06-29 | End: 2022-12-12

## 2022-06-30 NOTE — TELEPHONE ENCOUNTER
Refill completed.     Will need appointment after August   Would do a medicare physical with [facility]   OK to use same day, NP or hospital follow up

## 2022-09-03 ENCOUNTER — HEALTH MAINTENANCE LETTER (OUTPATIENT)
Age: 67
End: 2022-09-03

## 2022-09-27 ENCOUNTER — TELEPHONE (OUTPATIENT)
Dept: NURSING | Facility: CLINIC | Age: 67
End: 2022-09-27

## 2022-09-27 NOTE — TELEPHONE ENCOUNTER
Patient calling to schedule a visit for vaccines that are over due- Covid, Influenza, Pneumonia, and shingles. Informed patient that he is overdue for his annual physical exam. Last office visit was 8/10/2021. Advised patient schedule exam for annual physical to discuss vaccinations. May want to schedule appointment for flu and Covid vaccines if appointment is too far out. Patient verbalized understanding. Transferred patient to scheduling.     Cheryl Schwarz RN  09/27/22 3:22 PM  [facility] Nurse Advisor

## 2022-10-26 ENCOUNTER — TELEPHONE (OUTPATIENT)
Dept: FAMILY MEDICINE | Facility: CLINIC | Age: 67
End: 2022-10-26

## 2022-10-26 NOTE — TELEPHONE ENCOUNTER
Called pt and LVM on home phone (his cell phone could not leave )  Our clinic lost power and we had to shut the clinic down. Pt had appt for covid booster and flu vaccine. Pt can come to  for this OR we can reschedule him at  for a different day.

## 2022-11-09 ENCOUNTER — IMMUNIZATION (OUTPATIENT)
Dept: NURSING | Facility: CLINIC | Age: 67
End: 2022-11-09
Payer: MEDICARE

## 2022-11-09 PROCEDURE — 90662 IIV NO PRSV INCREASED AG IM: CPT

## 2022-11-09 PROCEDURE — G0008 ADMIN INFLUENZA VIRUS VAC: HCPCS | Mod: 59

## 2022-11-09 PROCEDURE — 91312 COVID-19,PF,PFIZER BOOSTER BIVALENT: CPT

## 2022-11-09 PROCEDURE — 0124A COVID-19,PF,PFIZER BOOSTER BIVALENT: CPT

## 2022-12-11 ASSESSMENT — ENCOUNTER SYMPTOMS
HEMATOCHEZIA: 0
PARESTHESIAS: 0
JOINT SWELLING: 0
EYE PAIN: 0
ARTHRALGIAS: 0
FREQUENCY: 0
SORE THROAT: 0
WEAKNESS: 0
CHILLS: 0
NAUSEA: 0
NERVOUS/ANXIOUS: 0
MYALGIAS: 0
CONSTIPATION: 0
DIARRHEA: 0
COUGH: 0
DIZZINESS: 0
SHORTNESS OF BREATH: 0
HEMATURIA: 0
DYSURIA: 0
ABDOMINAL PAIN: 0
HEADACHES: 0
FEVER: 0
PALPITATIONS: 0
HEARTBURN: 0

## 2022-12-11 ASSESSMENT — ACTIVITIES OF DAILY LIVING (ADL): CURRENT_FUNCTION: NO ASSISTANCE NEEDED

## 2022-12-12 ENCOUNTER — OFFICE VISIT (OUTPATIENT)
Dept: FAMILY MEDICINE | Facility: CLINIC | Age: 67
End: 2022-12-12
Payer: MEDICARE

## 2022-12-12 VITALS
DIASTOLIC BLOOD PRESSURE: 84 MMHG | RESPIRATION RATE: 18 BRPM | HEIGHT: 68 IN | BODY MASS INDEX: 32.51 KG/M2 | TEMPERATURE: 98.6 F | HEART RATE: 90 BPM | OXYGEN SATURATION: 93 % | WEIGHT: 214.5 LBS | SYSTOLIC BLOOD PRESSURE: 158 MMHG

## 2022-12-12 DIAGNOSIS — Z12.5 SCREENING FOR PROSTATE CANCER: ICD-10-CM

## 2022-12-12 DIAGNOSIS — E78.5 HYPERLIPIDEMIA LDL GOAL <130: ICD-10-CM

## 2022-12-12 DIAGNOSIS — Z13.1 SCREENING FOR DIABETES MELLITUS (DM): ICD-10-CM

## 2022-12-12 DIAGNOSIS — Z00.00 ROUTINE GENERAL MEDICAL EXAMINATION AT A HEALTH CARE FACILITY: Primary | ICD-10-CM

## 2022-12-12 DIAGNOSIS — Z23 NEED FOR VACCINATION FOR PNEUMOCOCCUS: ICD-10-CM

## 2022-12-12 DIAGNOSIS — Z87.891 PERSONAL HISTORY OF NICOTINE DEPENDENCE: ICD-10-CM

## 2022-12-12 DIAGNOSIS — R73.01 ELEVATED FASTING GLUCOSE: ICD-10-CM

## 2022-12-12 DIAGNOSIS — I77.811 ECTATIC ABDOMINAL AORTA (H): ICD-10-CM

## 2022-12-12 DIAGNOSIS — Z13.29 SCREENING FOR THYROID DISORDER: ICD-10-CM

## 2022-12-12 DIAGNOSIS — Z12.2 ENCOUNTER FOR SCREENING FOR LUNG CANCER: ICD-10-CM

## 2022-12-12 DIAGNOSIS — Z11.59 NEED FOR HEPATITIS C SCREENING TEST: ICD-10-CM

## 2022-12-12 DIAGNOSIS — Z12.11 SCREEN FOR COLON CANCER: ICD-10-CM

## 2022-12-12 DIAGNOSIS — I10 HTN, GOAL BELOW 140/90: ICD-10-CM

## 2022-12-12 LAB
ALBUMIN SERPL-MCNC: 4 G/DL (ref 3.4–5)
ALP SERPL-CCNC: 60 U/L (ref 40–150)
ALT SERPL W P-5'-P-CCNC: 61 U/L (ref 0–70)
ANION GAP SERPL CALCULATED.3IONS-SCNC: 4 MMOL/L (ref 3–14)
AST SERPL W P-5'-P-CCNC: 41 U/L (ref 0–45)
BILIRUB SERPL-MCNC: 0.5 MG/DL (ref 0.2–1.3)
BUN SERPL-MCNC: 12 MG/DL (ref 7–30)
CALCIUM SERPL-MCNC: 9.2 MG/DL (ref 8.5–10.1)
CHLORIDE BLD-SCNC: 103 MMOL/L (ref 94–109)
CHOLEST SERPL-MCNC: 178 MG/DL
CO2 SERPL-SCNC: 26 MMOL/L (ref 20–32)
CREAT SERPL-MCNC: 0.81 MG/DL (ref 0.66–1.25)
CREAT UR-MCNC: 42 MG/DL
FASTING STATUS PATIENT QL REPORTED: YES
GFR SERPL CREATININE-BSD FRML MDRD: >90 ML/MIN/1.73M2
GLUCOSE BLD-MCNC: 108 MG/DL (ref 70–99)
HBA1C MFR BLD: 5.6 % (ref 0–5.6)
HDLC SERPL-MCNC: 53 MG/DL
LDLC SERPL CALC-MCNC: 103 MG/DL
MICROALBUMIN UR-MCNC: 8 MG/L
MICROALBUMIN/CREAT UR: 19.05 MG/G CR (ref 0–17)
NONHDLC SERPL-MCNC: 125 MG/DL
POTASSIUM BLD-SCNC: 4.5 MMOL/L (ref 3.4–5.3)
PROT SERPL-MCNC: 7.9 G/DL (ref 6.8–8.8)
PSA SERPL-MCNC: 2.8 UG/L (ref 0–4)
SODIUM SERPL-SCNC: 133 MMOL/L (ref 133–144)
TRIGL SERPL-MCNC: 109 MG/DL
TSH SERPL DL<=0.005 MIU/L-ACNC: 2.01 MU/L (ref 0.4–4)

## 2022-12-12 PROCEDURE — 86803 HEPATITIS C AB TEST: CPT | Performed by: NURSE PRACTITIONER

## 2022-12-12 PROCEDURE — G0009 ADMIN PNEUMOCOCCAL VACCINE: HCPCS | Performed by: NURSE PRACTITIONER

## 2022-12-12 PROCEDURE — 83036 HEMOGLOBIN GLYCOSYLATED A1C: CPT | Performed by: NURSE PRACTITIONER

## 2022-12-12 PROCEDURE — 90677 PCV20 VACCINE IM: CPT | Performed by: NURSE PRACTITIONER

## 2022-12-12 PROCEDURE — 99207 PR INITIAL PREVENTIVE EXAM STAT: CPT | Performed by: NURSE PRACTITIONER

## 2022-12-12 PROCEDURE — 84443 ASSAY THYROID STIM HORMONE: CPT | Performed by: NURSE PRACTITIONER

## 2022-12-12 PROCEDURE — 80061 LIPID PANEL: CPT | Performed by: NURSE PRACTITIONER

## 2022-12-12 PROCEDURE — G0103 PSA SCREENING: HCPCS | Performed by: NURSE PRACTITIONER

## 2022-12-12 PROCEDURE — 82043 UR ALBUMIN QUANTITATIVE: CPT | Performed by: NURSE PRACTITIONER

## 2022-12-12 PROCEDURE — 99214 OFFICE O/P EST MOD 30 MIN: CPT | Mod: 25 | Performed by: NURSE PRACTITIONER

## 2022-12-12 PROCEDURE — 80053 COMPREHEN METABOLIC PANEL: CPT | Performed by: NURSE PRACTITIONER

## 2022-12-12 PROCEDURE — 36415 COLL VENOUS BLD VENIPUNCTURE: CPT | Performed by: NURSE PRACTITIONER

## 2022-12-12 RX ORDER — AMLODIPINE BESYLATE 10 MG/1
10 TABLET ORAL DAILY
Qty: 90 TABLET | Refills: 3 | Status: SHIPPED | OUTPATIENT
Start: 2022-12-12 | End: 2023-11-30

## 2022-12-12 RX ORDER — LISINOPRIL 40 MG/1
40 TABLET ORAL DAILY
Qty: 90 TABLET | Refills: 3 | Status: SHIPPED | OUTPATIENT
Start: 2022-12-12 | End: 2023-11-30

## 2022-12-12 ASSESSMENT — ENCOUNTER SYMPTOMS
PALPITATIONS: 0
HEADACHES: 0
HEARTBURN: 0
JOINT SWELLING: 0
WEAKNESS: 0
COUGH: 0
SORE THROAT: 0
DYSURIA: 0
HEMATOCHEZIA: 0
CHILLS: 0
DIARRHEA: 0
HEMATURIA: 0
FEVER: 0
CONSTIPATION: 0
MYALGIAS: 0
FREQUENCY: 0
SHORTNESS OF BREATH: 0
NERVOUS/ANXIOUS: 0
NAUSEA: 0
PARESTHESIAS: 0
ARTHRALGIAS: 0
ABDOMINAL PAIN: 0
EYE PAIN: 0
DIZZINESS: 0

## 2022-12-12 ASSESSMENT — PAIN SCALES - GENERAL: PAINLEVEL: NO PAIN (0)

## 2022-12-12 ASSESSMENT — ACTIVITIES OF DAILY LIVING (ADL): CURRENT_FUNCTION: NO ASSISTANCE NEEDED

## 2022-12-12 NOTE — PROGRESS NOTES
"SUBJECTIVE:   Aamir is a 67 year old who presents for Preventive Visit.  Patient has been advised of split billing requirements and indicates understanding: Yes  Are you in the first 12 months of your Medicare coverage?  No    Healthy Habits:     In general, how would you rate your overall health?  Good    Frequency of exercise:  2-3 days/week    Duration of exercise:  15-30 minutes    Do you usually eat at least 4 servings of fruit and vegetables a day, include whole grains    & fiber and avoid regularly eating high fat or \"junk\" foods?  No    Taking medications regularly:  Yes    Medication side effects:  None    Ability to successfully perform activities of daily living:  No assistance needed    Home Safety:  No safety concerns identified    Hearing Impairment:  No hearing concerns    In the past 6 months, have you been bothered by leaking of urine?  No    In general, how would you rate your overall mental or emotional health?  Good      PHQ-2 Total Score: 0    Additional concerns today:  No      Have you ever done Advance Care Planning? (For example, a Health Directive, POLST, or a discussion with a medical provider or your loved ones about your wishes): No, advance care planning information given to patient to review.  Patient plans to discuss their wishes with loved ones or provider.        Fall risk  Fallen 2 or more times in the past year?: No  Any fall with injury in the past year?: No  click delete button to remove this line now  Cognitive Screening   1) Repeat 3 items (Leader, Season, Table)    2) Clock draw: NORMAL  3) 3 item recall: Recalls 2 objects   Results: NORMAL clock, 1-2 items recalled: COGNITIVE IMPAIRMENT LESS LIKELY    Mini-CogTM Copyright RUMA Morales. Licensed by the author for use in [facility]; reprinted with permission (ariana@.[facility]). All rights reserved.      Do you have sleep apnea, excessive snoring or daytime drowsiness?: yes    Reviewed and updated as needed this visit by " clinical staff                  Reviewed and updated as needed this visit by Provider                 Social History     Tobacco Use     Smoking status: Every Day     Packs/day: 1.00     Years: 25.00     Pack years: 25.00     Types: Cigarettes     Smokeless tobacco: Never   Substance Use Topics     Alcohol use: Yes     Comment: 2-3 beers/day      If you drink alcohol do you typically have >3 drinks per day or >7 drinks per week? Yes    AUDIT - Alcohol Use Disorders Identification Test - Reproduced from the World Health Organization Audit 2001 (Second Edition) 12/11/2022   1.  How often do you have a drink containing alcohol? 4 or more times a week   2.  How many drinks containing alcohol do you have on a typical day when you are drinking? 3 or 4   3.  How often do you have five or more drinks on one occasion? Weekly   4.  How often during the last year have you found that you were not able to stop drinking once you had started? Less than monthly   5.  How often during the last year have you failed to do what was normally expected of you because of drinking? Never   6.  How often during the last year have you needed a first drink in the morning to get yourself going after a heavy drinking session? Never   7.  How often during the last year have you had a feeling of guilt or remorse after drinking? Never   8.  How often during the last year have you been unable to remember what happened the night before because of your drinking? Never   9.  Have you or someone else been injured because of your drinking? No   10. Has a relative, friend, doctor or other health care worker been concerned about your drinking or suggested you cut down? No   TOTAL SCORE 9       Current providers sharing in care for this patient include:   Patient Care Team:  No Ref-Primary, Physician as PCP - Izabela Spears APRN CNP as Assigned PCP  Danny Vargas MD as Assigned Heart and Vascular Provider    The following health maintenance  items are reviewed in Epic and correct as of today:  Health Maintenance   Topic Date Due     LUNG CANCER SCREENING  Never done     MEDICARE ANNUAL WELLNESS VISIT  09/28/2019     COLORECTAL CANCER SCREENING  07/30/2022     ZOSTER IMMUNIZATION (1 of 2) 12/17/2023 (Originally 9/28/2004)     NICOTINE/TOBACCO CESSATION COUNSELING Q 1 YR  12/12/2023     LIPID  12/12/2023     ANNUAL REVIEW OF HM ORDERS  12/12/2023     FALL RISK ASSESSMENT  12/12/2023     ADVANCE CARE PLANNING  12/12/2027     DTAP/TDAP/TD IMMUNIZATION (2 - Td or Tdap) 04/27/2029     HEPATITIS C SCREENING  Completed     PHQ-2 (once per calendar year)  Completed     INFLUENZA VACCINE  Completed     Pneumococcal Vaccine: 65+ Years  Completed     AORTIC ANEURYSM SCREENING (SYSTEM ASSIGNED)  Completed     COVID-19 Vaccine  Completed     IPV IMMUNIZATION  Aged Out     MENINGITIS IMMUNIZATION  Aged Out     Lab work is in process  Labs reviewed in EPIC  BP Readings from Last 3 Encounters:   12/12/22 (!) 158/84   09/29/21 137/88   08/24/21 134/88    Wt Readings from Last 3 Encounters:   12/12/22 97.3 kg (214 lb 8 oz)   09/29/21 90.8 kg (200 lb 1.6 oz)   08/10/21 89.3 kg (196 lb 12.8 oz)                  Patient Active Problem List   Diagnosis     Advanced directives, counseling/discussion     Hyperlipidemia LDL goal <130     Obesity (BMI 30-39.9)     Tobacco use disorder     Elevated fasting glucose     Erectile dysfunction, unspecified erectile dysfunction type     Vitamin D deficiency     Muscle cramp, nocturnal     WILL (obstructive sleep apnea)- mild (AHI 7)     Ectatic abdominal aorta (H)     Past Surgical History:   Procedure Laterality Date     COLONOSCOPY WITH CO2 INSUFFLATION N/A 7/30/2021    Procedure: COLONOSCOPY, WITH CO2 INSUFFLATION;  Surgeon: Albania Puentes DO;  Location: [facility] OR     HERNIA REPAIR  1974    age 17--right     LAPAROTOMY EXPLORATORY  1998    RLQ abcess     ROTATOR CUFF REPAIR RT/LT  2002    left        Social History     Tobacco Use      Smoking status: Every Day     Packs/day: 1.00     Years: 25.00     Pack years: 25.00     Types: Cigarettes     Smokeless tobacco: Never   Substance Use Topics     Alcohol use: Yes     Comment: 2-3 beers/day      Family History   Problem Relation Age of Onset     Unknown/Adopted Daughter      Hypertension Mother      Eye Disorder Mother         cataracts, glucoma      Diabetes No family hx of      Coronary Artery Disease No family hx of      Colon Cancer No family hx of          Current Outpatient Medications   Medication Sig Dispense Refill     amLODIPine (NORVASC) 10 MG tablet Take 1 tablet (10 mg) by mouth daily 90 tablet 3     IBUPROFEN PO Take by mouth as needed for moderate pain       lisinopril (ZESTRIL) 40 MG tablet Take 1 tablet (40 mg) by mouth daily 90 tablet 3     No Known Allergies  Pneumonia Vaccine:For adults 65 years or older who do not have an immunocompromising condition, cerebrospinal fluid leak, or cochlear implant and want to receive PCV13 AND PPSV23: Administer 1 dose of PCV13 first then give 1 dose of PPSV23 at least 1 year later. If the patient already received PPSV23, give the dose of PCV13 at least 1 year after they received the most recent dose of PPSV23. Anyone who received any doses of PPSV23 before age 65 should receive 1 final dose of the vaccine at age 65 or older. Administer this last dose at least 5 years after the prior PPSV23 dose.        Review of Systems   Constitutional: Negative for chills and fever.   HENT: Negative for congestion, ear pain, hearing loss and sore throat.    Eyes: Negative for pain and visual disturbance.   Respiratory: Negative for cough and shortness of breath.    Cardiovascular: Negative for chest pain, palpitations and peripheral edema.   Gastrointestinal: Negative for abdominal pain, constipation, diarrhea, heartburn, hematochezia and nausea.   Genitourinary: Positive for impotence. Negative for dysuria, frequency, genital sores, hematuria, penile  "discharge and urgency.   Musculoskeletal: Negative for arthralgias, joint swelling and myalgias.   Skin: Negative for rash.   Neurological: Negative for dizziness, weakness, headaches and paresthesias.   Psychiatric/Behavioral: Negative for mood changes. The patient is not nervous/anxious.        OBJECTIVE:   BP (!) 158/84 (BP Location: Right arm, Patient Position: Sitting, Cuff Size: Adult Large)   Pulse 90   Temp 98.6  F (37  C) (Oral)   Resp 18   Ht 1.715 m (5' 7.5\")   Wt 97.3 kg (214 lb 8 oz)   SpO2 93%   BMI 33.10 kg/m   Estimated body mass index is 33.1 kg/m  as calculated from the following:    Height as of this encounter: 1.715 m (5' 7.5\").    Weight as of this encounter: 97.3 kg (214 lb 8 oz).  Physical Exam  GENERAL: alert, no distress and obese  EYES: Eyes grossly normal to inspection and conjunctivae and sclerae normal  HENT: ear canals and TM's normal, nose and mouth without ulcers or lesions  NECK: no adenopathy, no asymmetry, masses, or scars and thyroid normal to palpation  RESP: lungs clear to auscultation - no rales, rhonchi or wheezes  CV: regular rates and rhythm, no murmur, click or rub, peripheral pulses strong and no peripheral edema  ABDOMEN: soft, nontender, no hepatosplenomegaly, no masses and bowel sounds normal  RECTAL: normal sphincter tone, no rectal masses, prostate normal size, smooth, nontender without nodules or masses  MS: no gross musculoskeletal defects noted, no edema  SKIN: no suspicious lesions or rashes  NEURO: Normal strength and tone, mentation intact and speech normal  PSYCH: mentation appears normal, affect normal/bright  LYMPH: no cervical, supraclavicular, axillary, or inguinal adenopathy    Diagnostic Test Results:  Labs reviewed in Epic  Results for orders placed or performed in visit on 12/12/22   Lipid panel reflex to direct LDL Fasting     Status: Abnormal   Result Value Ref Range    Cholesterol 178 <200 mg/dL    Triglycerides 109 <150 mg/dL    Direct " Measure HDL 53 >=40 mg/dL    LDL Cholesterol Calculated 103 (H) <=100 mg/dL    Non HDL Cholesterol 125 <130 mg/dL    Patient Fasting > 8hrs? Yes     Narrative    Cholesterol  Desirable:  <200 mg/dL    Triglycerides  Normal:  Less than 150 mg/dL  Borderline High:  150-199 mg/dL  High:  200-499 mg/dL  Very High:  Greater than or equal to 500 mg/dL    Direct Measure HDL  Female:  Greater than or equal to 50 mg/dL   Male:  Greater than or equal to 40 mg/dL    LDL Cholesterol  Desirable:  <100mg/dL  Above Desirable:  100-129 mg/dL   Borderline High:  130-159 mg/dL   High:  160-189 mg/dL   Very High:  >= 190 mg/dL    Non HDL Cholesterol  Desirable:  130 mg/dL  Above Desirable:  130-159 mg/dL  Borderline High:  160-189 mg/dL  High:  190-219 mg/dL  Very High:  Greater than or equal to 220 mg/dL   Albumin Random Urine Quantitative with Creat Ratio     Status: Abnormal   Result Value Ref Range    Creatinine Urine mg/dL 42 mg/dL    Albumin Urine mg/L 8 mg/L    Albumin Urine mg/g Cr 19.05 (H) 0.00 - 17.00 mg/g Cr   Comprehensive metabolic panel     Status: Abnormal   Result Value Ref Range    Sodium 133 133 - 144 mmol/L    Potassium 4.5 3.4 - 5.3 mmol/L    Chloride 103 94 - 109 mmol/L    Carbon Dioxide (CO2) 26 20 - 32 mmol/L    Anion Gap 4 3 - 14 mmol/L    Urea Nitrogen 12 7 - 30 mg/dL    Creatinine 0.81 0.66 - 1.25 mg/dL    Calcium 9.2 8.5 - 10.1 mg/dL    Glucose 108 (H) 70 - 99 mg/dL    Alkaline Phosphatase 60 40 - 150 U/L    AST 41 0 - 45 U/L    ALT 61 0 - 70 U/L    Protein Total 7.9 6.8 - 8.8 g/dL    Albumin 4.0 3.4 - 5.0 g/dL    Bilirubin Total 0.5 0.2 - 1.3 mg/dL    GFR Estimate >90 >60 mL/min/1.73m2   Hemoglobin A1c     Status: Normal   Result Value Ref Range    Hemoglobin A1C 5.6 0.0 - 5.6 %   TSH with free T4 reflex     Status: Normal   Result Value Ref Range    TSH 2.01 0.40 - 4.00 mU/L   Prostate Specific Antigen Screen     Status: Normal   Result Value Ref Range    Prostate Specific Antigen Screen 2.80 0.00 - 4.00  ug/L   Hepatitis C Screen Reflex to HCV RNA Quant and Genotype     Status: Normal   Result Value Ref Range    Hepatitis C Antibody Nonreactive Nonreactive    Narrative    Assay performance characteristics have not been established for newborns, infants, and children.       ASSESSMENT / PLAN:   Aamir was seen today for physical.    Diagnoses and all orders for this visit:    Routine general medical examination at a health care facility  -     REVIEW OF HEALTH MAINTENANCE PROTOCOL ORDERS    HTN, goal below 140/90  -     Albumin Random Urine Quantitative with Creat Ratio; Future  -     Comprehensive metabolic panel; Future  -     amLODIPine (NORVASC) 10 MG tablet; Take 1 tablet (10 mg) by mouth daily  -     lisinopril (ZESTRIL) 40 MG tablet; Take 1 tablet (40 mg) by mouth daily  -     Albumin Random Urine Quantitative with Creat Ratio  -     Comprehensive metabolic panel  HTN Plan:  1)  Medication: dosage change: increase amlodipine to 10 mg   2)  Dietary sodium restriction  3)  Regular aerobic exercise  4)  Recheck in 1 year, sooner should new symptoms or   problems arise.  5) See todays orders.  Make appointment for blood pressure check only  in 2 weeks      Patient Education: Reviewed risks of hypertension and principles of   treatment.      Hyperlipidemia LDL goal <130  -     Lipid panel reflex to direct LDL Fasting; Future  -     Comprehensive metabolic panel; Future  -     Lipid panel reflex to direct LDL Fasting  -     Comprehensive metabolic panel    Screening for diabetes mellitus (DM)  -     Comprehensive metabolic panel; Future  -     Comprehensive metabolic panel    Screening for prostate cancer  -     Prostate Specific Antigen Screen; Future  -     Prostate Specific Antigen Screen    Elevated fasting glucose  -     Hemoglobin A1c; Future  -     Hemoglobin A1c    Screening for thyroid disorder  -     TSH with free T4 reflex; Future  -     TSH with free T4 reflex    Need for hepatitis C screening test  -      Hepatitis C Screen Reflex to HCV RNA Quant and Genotype; Future  -     Hepatitis C Screen Reflex to HCV RNA Quant and Genotype    Screen for colon cancer  -     Colonoscopy Screening  Referral; Future    Need for vaccination for pneumococcus  -     Pneumococcal 20 Valent Conjugate (Prevnar 20)    Encounter for screening for lung cancer  -     CT Chest Lung Cancer Screen Low Dose Without; Future    Personal history of nicotine dependence  -     CT Chest Lung Cancer Screen Low Dose Without; Future    Ectatic abdominal aorta (H)  Recommendation by cardiology to recheck every 2 years will do next year     PLAN:   Patient needs to follow up in if no improvement,or sooner if worsening of symptoms or other symptoms develop.  FURTHER TESTING:       - CT lung cancer screening   I will place order. Please call [phone] to schedule.  CONSULTATION/REFERRAL to colonoscopy   Work on weight loss  Regular exercise  Will follow up and/or notify patient of  results via My Chart to determine further need for followup  Follow up office visit in one year for annual health maintenance exam, sooner PRN.  Patient has been advised of split billing requirements and indicates understanding: Yes      COUNSELING:  Reviewed preventive health counseling, as reflected in patient instructions  Special attention given to:       Regular exercise       Healthy diet/nutrition       Vision screening       Immunizations    Vaccinated for: Pneumococcal             Hepatitis C screening       HIV screening for high risk patient       Prostate cancer screening       The 10-year ASCVD risk score (Alexander CERON, et al., 2019) is: 29.2%    Values used to calculate the score:      Age: 67 years      Sex: Male      Is Non- : No      Diabetic: No      Tobacco smoker: Yes      Systolic Blood Pressure: 158 mmHg      Is BP treated: Yes      HDL Cholesterol: 53 mg/dL      Total Cholesterol: 178 mg/dL        He reports that he has been  smoking cigarettes. He has a 25.00 pack-year smoking history. He has never used smokeless tobacco.  Nicotine/Tobacco Cessation Plan:   Information offered: Patient not interested at this time      Appropriate preventive services were discussed with this patient, including applicable screening as appropriate for cardiovascular disease, diabetes, osteopenia/osteoporosis, and glaucoma.  As appropriate for age/gender, discussed screening for colorectal cancer, prostate cancer, breast cancer, and cervical cancer. Checklist reviewing preventive services available has been given to the patient.    Reviewed patients plan of care and provided an AVS. The Intermediate Care Plan ( asthma action plan, low back pain action plan, and migraine action plan) for Aamir meets the Care Plan requirement. This Care Plan has been established and reviewed with the Patient.          [facility]    Identified Health Risks:

## 2022-12-12 NOTE — PATIENT INSTRUCTIONS
PLAN:   1.   Symptomatic therapy suggested: increase amlodipine to 10 mg a day.  2.  Orders Placed This Encounter   Medications    amLODIPine (NORVASC) 10 MG tablet     Sig: Take 1 tablet (10 mg) by mouth daily     Dispense:  90 tablet     Refill:  3    lisinopril (ZESTRIL) 40 MG tablet     Sig: Take 1 tablet (40 mg) by mouth daily     Dispense:  90 tablet     Refill:  3     Orders Placed This Encounter   Procedures    REVIEW OF HEALTH MAINTENANCE PROTOCOL ORDERS    CT Chest Lung Cancer Screen Low Dose Without    Pneumococcal 20 Valent Conjugate (Prevnar 20)    Lipid panel reflex to direct LDL Fasting    Albumin Random Urine Quantitative with Creat Ratio    Comprehensive metabolic panel    Hemoglobin A1c    TSH with free T4 reflex    Prostate Specific Antigen Screen    Hepatitis C Screen Reflex to HCV RNA Quant and Genotype    Colonoscopy Screening  Referral       3. Patient needs to follow up in if no improvement,or sooner if worsening of symptoms or other symptoms develop.  FURTHER TESTING:       - CT lung cancer screening   I will place order. Please call [phone] to schedule.  CONSULTATION/REFERRAL to colonoscopy   Work on weight loss  Regular exercise  Will follow up and/or notify patient of  results via My Chart to determine further need for followup  Follow up office visit in one year for annual health maintenance exam, sooner PRN.

## 2022-12-13 LAB — HCV AB SERPL QL IA: NONREACTIVE

## 2022-12-18 PROBLEM — I77.811 ECTATIC ABDOMINAL AORTA (H): Status: ACTIVE | Noted: 2022-12-18

## 2022-12-19 NOTE — RESULT ENCOUNTER NOTE
Allie Wilkins,    Attached are your test results.  -PSA (prostate specific antigen) test is normal.  -LDL(bad) cholesterol level is elevated which can increase your heart disease risk.  A diet high in fat and simple carbohydrates, genetics and being overweight can contribute to this. ADVISE: exercising 150 minutes of aerobic exercise per week (30 minutes for 5 days per week or 50 minutes for 3 days per week are options) and eating a low saturated fat/low carbohydrate diet are helpful to improve this. In 12 months, you should recheck your fasting cholesterol panel by scheduling a lab-only appointment.  -Liver and gallbladder tests (ALT,AST, Alk phos,bilirubin) are normal.  -Kidney function (GFR) is normal.  -Sodium is normal.  -Potassium is normal.  -Calcium is normal.  -Glucose is slight elevated and may be a sign of early diabetes (prediabetes). ADVISE:: eating a low carbohydrate diet, exercising, trying to lose weight (if necessary) and rechecking your glucose level in 12 months.  -A1C (diabetic test) is normal and indicates that your blood sugar has been in a normal range the last 3 months.  -TSH (thyroid stimulating hormone) level is normal which indicates normal thyroid function.  -Microalbumin (urine protein) level is elevated. This is suggestive of early damage to your kidneys from high blood pressure.  ADVISE: avoiding anti-inflamatory agents such as ibuprofen (Advil, Motrin) or naproxen (Aleve) as much as possible, keeping your blood pressure in a normal range, and continuing your medication (lisinopril) that helps protect your kidneys.  Also, this should be rechecked in 1 year.   -Hepatitis C antibody screen test shows no signs of a previous hepatitis C infection.     Please contact us if you have any questions.    Izabela Moulton, CNP

## 2022-12-29 ENCOUNTER — TELEPHONE (OUTPATIENT)
Dept: GASTROENTEROLOGY | Facility: CLINIC | Age: 67
End: 2022-12-29

## 2022-12-29 ENCOUNTER — HOSPITAL ENCOUNTER (OUTPATIENT)
Facility: AMBULATORY SURGERY CENTER | Age: 67
End: 2022-12-29
Attending: INTERNAL MEDICINE
Payer: MEDICARE

## 2022-12-29 NOTE — TELEPHONE ENCOUNTER
Screening Questions  BLUE  KIND OF PREP RED  LOCATION [review exclusion criteria] GREEN  SEDATION TYPE    Y Are you active on mychart?   Izabela Moulton, HAVEN CNP  Ordering/Referring Provider?    MEDICARE/ BCBS  What type of coverage do you have?  N Have you had a positive covid test in the last 14 days?    33.5  1. BMI  [BMI 40+ - review exclusion criteria]            *NEED PAC APPT AT [facility]*     SELF   2. Are you able to give consent for your medical care? [IF NO,RN REVIEW]        N  3. Are you taking any prescription pain medications on a routine schedule?        N 3a. EXTENDED PREP What kind of prescription?     N 4. Do you have any chemical dependencies such as alcohol, street drugs, or methadone?    N 5. Do you have any history of post-traumatic stress syndrome, severe anxiety or history of psychosis?      **If yes 3- 5 , please schedule with MAC sedation.**          IF YES TO ANY 6 - 10 - HOSPITAL SETTING ONLY.     N 6.   Do you need assistance transferring?     N 7.   Have you had a heart or lung transplant?    N 8.   Are you currently on dialysis?   N 9.   Do you use daily home oxygen?   N 10. Do you take nitroglycerin?   10a. N If yes, how often?     11. [FEMALES]   Are you currently pregnant?     11a.  If yes, how many weeks? [ Greater than 12 weeks, OR NEEDED]    N 12. [review exclusion criteria]  Do you have any implantable devices in your body (pacemaker, defib, LVAD)?            *NEED PAC APPT AT [facility]*     N 13. Do you have Pulmonary Hypertension?             *NEED PAC APPT AT [facility]*     N 14. In the past 6 months, have you had any heart related issues including cardiomyopathy or heart attack?     N 14a. If yes, did it require cardiac stenting if so when?     N 15. Have you had a stroke or Transient ischemic attack (TIA - aka  mini stroke ) within 6 months?      N 16. Do you have mod to severe Obstructive Sleep Apnea?  [Hospital only - Ok at [facility]]    N 17. Do you have SEVERE AND  "UNCONTROLLED asthma?              *NEED PAC APPT AT [facility]*     N 18. Are you currently taking any blood thinners?     18a. If yes, inform patient to \"follow up w/ ordering provider for bridging instructions.\"    N 19. Do you take the medication Phentermine?    19a. If yes, \"Hold for 7 days before procedure.  Please consult your prescribing provider if you have questions about holding this medication.\"     N  20. Do you have chronic kidney disease?      N  21. Do you have a diagnosis of diabetes?     N  22. On a regular basis do you go 3-5 days between bowel movements?     23. Preferred LOCAL Pharmacy for Pre Prescription    [ LIST ONLY ONE PHARMACY]          [id number] - [place], [place] - [address] [address]        - CLOSING REMINDERS -    Informed patient they will need an adult    Cannot take any type of public or medical transportation alone    Conscious Sedation- Needs  for 6 hours after the procedure       MAC/General-Needs  for 24 hours after procedure    Pre-Procedure Covid test to be completed [ESSC PCR Testing Required]    Confirmed Nurse will call to complete assessment       - SCHEDULING DETAILS -      Hospital Setting Required? If yes, what is the exclusion?:        Additional comments:  DOROTEO GARCIA   Surgeon   03/03/2023  Date of Procedure  MODERATE  Sedation Type     N PAC / Pre-op Required   Location  [facility]       Type of Procedure Scheduled  Lower Endoscopy [Colonoscopy]      Which Colonoscopy Prep was Sent?     STANDARD GOLYTELY-If you answer yes to questions #8, #20, #21          "

## 2023-01-05 ENCOUNTER — ANCILLARY PROCEDURE (OUTPATIENT)
Dept: CT IMAGING | Facility: CLINIC | Age: 68
End: 2023-01-05
Attending: NURSE PRACTITIONER
Payer: MEDICARE

## 2023-01-05 DIAGNOSIS — Z87.891 PERSONAL HISTORY OF NICOTINE DEPENDENCE: ICD-10-CM

## 2023-01-05 DIAGNOSIS — Z12.2 ENCOUNTER FOR SCREENING FOR LUNG CANCER: ICD-10-CM

## 2023-01-05 PROCEDURE — 71271 CT THORAX LUNG CANCER SCR C-: CPT | Mod: GC | Performed by: RADIOLOGY

## 2023-01-09 NOTE — RESULT ENCOUNTER NOTE
Allie Wilkins,    Attached are your test results.  -Lung CT did not show any signs of suspicious lung nodules. ADVISE: Rechecking a lung CT scan in 12 months.   Please contact us if you have any questions.    Izabela Moulton, CNP

## 2023-01-31 ENCOUNTER — TELEPHONE (OUTPATIENT)
Dept: GASTROENTEROLOGY | Facility: CLINIC | Age: 68
End: 2023-01-31
Payer: MEDICARE

## 2023-01-31 ENCOUNTER — MYC MEDICAL ADVICE (OUTPATIENT)
Dept: CALL CENTER | Age: 68
End: 2023-01-31
Payer: MEDICARE

## 2023-01-31 NOTE — TELEPHONE ENCOUNTER
Caller: Aamir Wilkins   Reason for Reschedule/Cancellation (please be detailed, any staff messages or encounters to note?):     [facility] IS OUT OF OFFICE       Prior to reschedule please review:    Ordering Provider:Izabela Moulton APRN CNP     Sedation per order:MODERATE     Does patient have any ASC Exclusions, please identify?: N      Notes on Cancelled Procedure:  1. Procedure:Lower Endoscopy [Colonoscopy]   2. Date: 04/07/2023  3. Location:[facility]; [address], [place] [address]  4. Surgeon: JOSE         Rescheduled: YES     Procedure: Lower Endoscopy [Colonoscopy]    Date: 04/07/2023    Location:[facility]; [address], [place] [address]    Surgeon: JOSE     Sedation Level Scheduled  MODERATE          Reason for Sedation Level PER ORDER     Prep/Instructions updated and sent: JOY

## 2023-02-27 ENCOUNTER — TELEPHONE (OUTPATIENT)
Dept: GASTROENTEROLOGY | Facility: CLINIC | Age: 68
End: 2023-02-27
Payer: MEDICARE

## 2023-02-27 NOTE — TELEPHONE ENCOUNTER
Caller: Aamir Wilkins    Reason for Reschedule/Cancellation (please be detailed, any staff messages or encounters to note?): Provider Out      Prior to reschedule please review:    Ordering Provider:Izabela Moulton APRN CNP     Sedation per order:Moderate    Does patient have any ASC Exclusions, please identify?: N      Notes on Cancelled Procedure:    Procedure:Lower Endoscopy [Colonoscopy]     Date: 4/7    Location:[facility]; [address], [place] [address]    Surgeon: Christina        Rescheduled: Yes    Procedure: Lower Endoscopy [Colonoscopy]     Date: 4/28    Location:[facility]; [address], [place] [address]    Surgeon: Seda    Sedation Level Scheduled  MODERATE,  Reason for Sedation Level Per Order     Prep/Instructions updated and sent: Yes

## 2023-02-27 NOTE — TELEPHONE ENCOUNTER
Caller: Writer to patient.   Reason for Reschedule/Cancellation (please be detailed, any staff messages or encounters to note?): [facility] out. This is the SECOND time patient has been rescheduled due to [facility] being out.       Prior to reschedule please review:    Ordering Provider:Izabela Moulton APRN CNP    Sedation per order:Moderate    Does patient have any ASC Exclusions, please identify?: No      Notes on Cancelled Procedure:    Procedure:Lower Endoscopy [Colonoscopy]     Date: 4/7/2023    Location:[facility]; [address], [place] [address]    Surgeon: Dhaval        Rescheduled: CELESTINA Jennings and sent MyChart for patient to call back to reschedule. CASE IN DEPOT.

## 2023-04-13 ENCOUNTER — TELEPHONE (OUTPATIENT)
Dept: GASTROENTEROLOGY | Facility: CLINIC | Age: 68
End: 2023-04-13
Payer: MEDICARE

## 2023-04-13 DIAGNOSIS — Z12.11 ENCOUNTER FOR SCREENING COLONOSCOPY: Primary | ICD-10-CM

## 2023-04-13 RX ORDER — BISACODYL 5 MG/1
TABLET, DELAYED RELEASE ORAL
Qty: 4 TABLET | Refills: 0 | Status: SHIPPED | OUTPATIENT
Start: 2023-04-13 | End: 2023-04-28

## 2023-04-13 NOTE — TELEPHONE ENCOUNTER
Attempted to contact patient regarding upcoming Colonoscopy  procedure on 04.28.2023 for pre assessment questions. No answer.     Left message to return call to [phone] #4    Discuss Covid policy and designated  policy.    Mild WILL-per WILL workflow can stay at [facility]    Pre op exam? N/A    Arrival time: 1030. Procedure time: 1115    Facility location: [facility] [place]; [address], [place] [address]    Sedation type: Conscious sedation     Anticoagulants: No    Electronic implanted devices? No    Diabetic? No    Indication for procedure: screening colonoscopy    Verify Bowel habits-last colonoscopy prep was fair.    Prescription and instructions need to be sent.       Krista Porter RN  Endoscopy Procedure Pre Assessment RN

## 2023-04-13 NOTE — TELEPHONE ENCOUNTER
Pre assessment questions completed for upcoming Colonoscopy  procedure scheduled on 4/28/23    COVID policy reviewed.     Pre-op exam? N/A    Reviewed procedural arrival time 1030, procedure time 1115 and facility location [facility]; [address], [place] [address]    Designated  policy reviewed. Instructed to have someone stay 6 hours post procedure.     Anticoagulation/blood thinners? No    Electronic implanted devices? No    Diabetic? No    Bowel prep recommendation: Extended prep Golytely  -- Last prep was fair. The Pt reports he did standard Golytely previously, he followed all the diet changes and drank the whole prep. Will increase to extended Golytely.     Reviewed procedure prep instructions.     Prep instructions sent via [employer].  Bowel prep script sent to [facility] #[id number] - [place], [place] - [address].     Patient verbalized understanding and had no questions or concerns at this time.    Elisabet Mcfadden RN  Endoscopy Procedure Pre Assessment RN

## 2023-04-28 ENCOUNTER — HOSPITAL ENCOUNTER (OUTPATIENT)
Facility: AMBULATORY SURGERY CENTER | Age: 68
Discharge: HOME | End: 2023-04-28
Attending: SURGERY | Admitting: SURGERY
Payer: MEDICARE

## 2023-04-28 VITALS
SYSTOLIC BLOOD PRESSURE: 131 MMHG | HEART RATE: 73 BPM | OXYGEN SATURATION: 94 % | DIASTOLIC BLOOD PRESSURE: 84 MMHG | TEMPERATURE: 97.5 F | RESPIRATION RATE: 16 BRPM

## 2023-04-28 LAB — COLONOSCOPY: NORMAL

## 2023-04-28 PROCEDURE — G0500 MOD SEDAT ENDO SERVICE >5YRS: HCPCS | Performed by: SURGERY

## 2023-04-28 PROCEDURE — 88305 TISSUE EXAM BY PATHOLOGIST: CPT | Performed by: STUDENT IN AN ORGANIZED HEALTH CARE EDUCATION/TRAINING PROGRAM

## 2023-04-28 PROCEDURE — G8907 PT DOC NO EVENTS ON DISCHARG: HCPCS

## 2023-04-28 PROCEDURE — 45380 COLONOSCOPY AND BIOPSY: CPT | Mod: XS

## 2023-04-28 PROCEDURE — 45385 COLONOSCOPY W/LESION REMOVAL: CPT | Mod: PT | Performed by: SURGERY

## 2023-04-28 PROCEDURE — G8918 PT W/O PREOP ORDER IV AB PRO: HCPCS

## 2023-04-28 PROCEDURE — 45381 COLONOSCOPY SUBMUCOUS NJX: CPT | Mod: PT | Performed by: SURGERY

## 2023-04-28 PROCEDURE — 45381 COLONOSCOPY SUBMUCOUS NJX: CPT

## 2023-04-28 PROCEDURE — 45385 COLONOSCOPY W/LESION REMOVAL: CPT

## 2023-04-28 RX ORDER — NALOXONE HYDROCHLORIDE 0.4 MG/ML
0.4 INJECTION, SOLUTION INTRAMUSCULAR; INTRAVENOUS; SUBCUTANEOUS
Status: CANCELLED | OUTPATIENT
Start: 2023-04-28

## 2023-04-28 RX ORDER — FLUMAZENIL 0.1 MG/ML
0.2 INJECTION, SOLUTION INTRAVENOUS
Status: CANCELLED | OUTPATIENT
Start: 2023-04-28 | End: 2023-04-28

## 2023-04-28 RX ORDER — ONDANSETRON 2 MG/ML
4 INJECTION INTRAMUSCULAR; INTRAVENOUS
Status: DISCONTINUED | OUTPATIENT
Start: 2023-04-28 | End: 2023-04-29 | Stop reason: HOSPADM

## 2023-04-28 RX ORDER — ONDANSETRON 4 MG/1
4 TABLET, ORALLY DISINTEGRATING ORAL EVERY 6 HOURS PRN
Status: CANCELLED | OUTPATIENT
Start: 2023-04-28

## 2023-04-28 RX ORDER — ONDANSETRON 2 MG/ML
4 INJECTION INTRAMUSCULAR; INTRAVENOUS EVERY 6 HOURS PRN
Status: CANCELLED | OUTPATIENT
Start: 2023-04-28

## 2023-04-28 RX ORDER — LIDOCAINE 40 MG/G
CREAM TOPICAL
Status: DISCONTINUED | OUTPATIENT
Start: 2023-04-28 | End: 2023-04-29 | Stop reason: HOSPADM

## 2023-04-28 RX ORDER — NALOXONE HYDROCHLORIDE 0.4 MG/ML
0.2 INJECTION, SOLUTION INTRAMUSCULAR; INTRAVENOUS; SUBCUTANEOUS
Status: CANCELLED | OUTPATIENT
Start: 2023-04-28

## 2023-04-28 RX ORDER — FENTANYL CITRATE 50 UG/ML
INJECTION, SOLUTION INTRAMUSCULAR; INTRAVENOUS PRN
Status: DISCONTINUED | OUTPATIENT
Start: 2023-04-28 | End: 2023-04-28 | Stop reason: HOSPADM

## 2023-04-28 RX ORDER — PROCHLORPERAZINE MALEATE 5 MG
5 TABLET ORAL EVERY 6 HOURS PRN
Status: CANCELLED | OUTPATIENT
Start: 2023-04-28

## 2023-04-28 NOTE — H&P
Pre-Endoscopy History and Physical     Aamir Wilkins MRN# [id number]   YOB: 1955 Age: 67 year old     Date of Procedure: 4/28/2023  Primary care provider: No Ref-Primary, Physician  Type of Endoscopy: colonoscopy  Reason for Procedure: surveillance  Type of Anesthesia Anticipated: Moderate Sedation    HPI:    Aamir is a 67 year old male who will be undergoing the above procedure.      A history and physical has been performed. The patient's medications and allergies have been reviewed. The risks and benefits of the procedure and the sedation options and risks were discussed with the patient.  All questions were answered and informed consent was obtained.      He denies a personal or family history of anesthesia complications or bleeding disorders.     No Known Allergies     Cannot display prior to admission medications because the patient has not been admitted in this contact.     Current Outpatient Medications   Medication     amLODIPine (NORVASC) 10 MG tablet     lisinopril (ZESTRIL) 40 MG tablet     IBUPROFEN PO     Current Facility-Administered Medications   Medication     lidocaine (LMX4) kit     lidocaine 1 % 0.1-1 mL     ondansetron (ZOFRAN) injection 4 mg     sodium chloride (PF) 0.9% PF flush 3 mL     sodium chloride (PF) 0.9% PF flush 3 mL         Patient Active Problem List   Diagnosis     Advanced directives, counseling/discussion     Hyperlipidemia LDL goal <130     Obesity (BMI 30-39.9)     Tobacco use disorder     Elevated fasting glucose     Erectile dysfunction, unspecified erectile dysfunction type     Vitamin D deficiency     Muscle cramp, nocturnal     WILL (obstructive sleep apnea)- mild (AHI 7)     Ectatic abdominal aorta (H)        Past Medical History:   Diagnosis Date     Hypertension      NO ACTIVE PROBLEMS         Past Surgical History:   Procedure Laterality Date     COLONOSCOPY WITH CO2 INSUFFLATION N/A 7/30/2021    Procedure: COLONOSCOPY, WITH CO2 INSUFFLATION;   "Surgeon: Albania Puentes DO;  Location: [facility] OR     HERNIA REPAIR  1974    age 17--right     LAPAROTOMY EXPLORATORY  1998    RLQ abcess     ROTATOR CUFF REPAIR RT/LT  2002    left        Social History     Tobacco Use     Smoking status: Every Day     Packs/day: 1.00     Years: 25.00     Pack years: 25.00     Types: Cigarettes     Smokeless tobacco: Never   Vaping Use     Vaping status: Never Used   Substance Use Topics     Alcohol use: Yes     Comment: 2-3 beers/day        Family History   Problem Relation Age of Onset     Unknown/Adopted Daughter      Hypertension Mother      Eye Disorder Mother         cataracts, glucoma      Diabetes No family hx of      Coronary Artery Disease No family hx of      Colon Cancer No family hx of        REVIEW OF SYSTEMS:     5 point ROS negative except as noted above in HPI, including Gen., Resp., CV, GI &  system review.      PHYSICAL EXAM:   BP (!) 143/86   Temp 97.5  F (36.4  C) (Temporal)   Resp 16   SpO2 95%  Estimated body mass index is 33.1 kg/m  as calculated from the following:    Height as of 12/12/22: 1.715 m (5' 7.5\").    Weight as of 12/12/22: 97.3 kg (214 lb 8 oz).   GENERAL APPEARANCE: healthy, alert and no distress  MENTAL STATUS: alert  AIRWAY EXAM: Mallampatti Class III (visualization of the soft palate and base of uvula)  RESP: lungs clear to auscultation - no rales, rhonchi or wheezes  CV: regular rates and rhythm      DIAGNOSTICS:    Not indicated      IMPRESSION   ASA Class 2 - Mild systemic disease        PLAN:       Plan for colonoscopy. We discussed the risks, benefits and alternatives and the patient wished to proceed.    The above has been forwarded to the consulting provider.      Signed Electronically by: Blair Stack MD  April 28, 2023    "

## 2023-05-03 ENCOUNTER — TELEPHONE (OUTPATIENT)
Dept: SURGERY | Facility: CLINIC | Age: 68
End: 2023-05-03

## 2023-05-03 NOTE — TELEPHONE ENCOUNTER
----- Message from Blair Stack MD sent at 5/2/2023  3:07 PM CDT -----  Colon pathology showed 1 adenoma type polyp which is benign but the kind with cancer risk. The biopsies of the ulcer area confirmed ulcer, looks like from ischemia (low blood flow) and not from a polyp or mass. The other polyp biopsies were normal colon tissue or hyperplastic polyps which do not have cancer risk. Recommend next colonoscopy in 5 years for polyp surveillance but would also consider repeat colonoscopy to look at the ulcer area in 3-6 months and follow up with your PCP

## 2023-05-05 DIAGNOSIS — K63.3 COLONIC ULCER: Primary | ICD-10-CM

## 2023-06-14 ENCOUNTER — TELEPHONE (OUTPATIENT)
Dept: FAMILY MEDICINE | Facility: CLINIC | Age: 68
End: 2023-06-14
Payer: MEDICARE

## 2023-06-14 NOTE — TELEPHONE ENCOUNTER
Patient Quality Outreach    Patient is due for the following:   Hypertension -  BP check      Topic Date Due     COVID-19 Vaccine (5 - Pfizer series) 03/09/2023       Next Steps:   Schedule a nurse only visit for bp check and immunization    Type of outreach:    Sent [employer] message.      Questions for provider review:    None           Diane L. Schoenherr, RN

## 2023-11-03 ENCOUNTER — IMMUNIZATION (OUTPATIENT)
Dept: FAMILY MEDICINE | Facility: CLINIC | Age: 68
End: 2023-11-03
Payer: MEDICARE

## 2023-11-03 DIAGNOSIS — Z23 ENCOUNTER FOR IMMUNIZATION: Primary | ICD-10-CM

## 2023-11-03 DIAGNOSIS — Z29.11 NEED FOR VACCINATION AGAINST RESPIRATORY SYNCYTIAL VIRUS: ICD-10-CM

## 2023-11-03 PROCEDURE — 90480 ADMN SARSCOV2 VAC 1/ONLY CMP: CPT

## 2023-11-03 PROCEDURE — 99207 PR NO CHARGE NURSE ONLY: CPT

## 2023-11-03 PROCEDURE — 90678 RSV VACC PREF BIVALENT IM: CPT

## 2023-11-03 PROCEDURE — G0008 ADMIN INFLUENZA VIRUS VAC: HCPCS

## 2023-11-03 PROCEDURE — 91320 SARSCV2 VAC 30MCG TRS-SUC IM: CPT

## 2023-11-03 PROCEDURE — 90662 IIV NO PRSV INCREASED AG IM: CPT

## 2023-11-03 PROCEDURE — 90472 IMMUNIZATION ADMIN EACH ADD: CPT

## 2023-11-03 NOTE — PROGRESS NOTES
Prior to immunization administration, verified patients identity using patient s name and date of birth. Please see Immunization Activity for additional information.     Screening Questionnaire for Adult Immunization    Are you sick today?   No   Do you have allergies to medications, food, a vaccine component or latex?   No   Have you ever had a serious reaction after receiving a vaccination?   No   Do you have a long-term health problem with heart, lung, kidney, or metabolic disease (e.g., diabetes), asthma, a blood disorder, no spleen, complement component deficiency, a cochlear implant, or a spinal fluid leak?  Are you on long-term aspirin therapy?   No   Do you have cancer, leukemia, HIV/AIDS, or any other immune system problem?   No   Do you have a parent, brother, or sister with an immune system problem?   No   In the past 3 months, have you taken medications that affect  your immune system, such as prednisone, other steroids, or anticancer drugs; drugs for the treatment of rheumatoid arthritis, Crohn s disease, or psoriasis; or have you had radiation treatments?   No   Have you had a seizure, or a brain or other nervous system problem?   No   During the past year, have you received a transfusion of blood or blood    products, or been given immune (gamma) globulin or antiviral drug?   No   For women: Are you pregnant or is there a chance you could become       pregnant during the next month?   No   Have you received any vaccinations in the past 4 weeks?   No     Immunization questionnaire answers were all negative.    I have reviewed the following standing orders:   This patient is due and qualifies for the Covid-19 vaccine.     Click here for COVID-19 Standing Order    I have reviewed the vaccines inclusion and exclusion criteria; No concerns regarding eligibility.     This patient is due and qualifies for the Influenza vaccine.    Click here for Influenza Vaccine Standing Order    I have reviewed the  vaccines inclusion and exclusion criteria; No concerns regarding eligibility.         This patient is due and qualifies for the RSV vaccine.    Click here for RSV Vaccine Standing Order    I have reviewed the vaccines inclusion and exclusion criteria; No concerns regarding eligibility.     Patient instructed to remain in clinic for 15 minutes afterwards, and to report any adverse reactions.     Screening performed by Jasmina Thompson MA on 11/3/2023 at 12:43 PM.  Prior to immunization administration, verified patients identity using patient s name and date of birth. Please see Immunization Activity for additional information.     Screening Questionnaire for Adult Immunization    Are you sick today?   No   Do you have allergies to medications, food, a vaccine component or latex?   No   Have you ever had a serious reaction after receiving a vaccination?   No   Do you have a long-term health problem with heart, lung, kidney, or metabolic disease (e.g., diabetes), asthma, a blood disorder, no spleen, complement component deficiency, a cochlear implant, or a spinal fluid leak?  Are you on long-term aspirin therapy?   No   Do you have cancer, leukemia, HIV/AIDS, or any other immune system problem?   No   Do you have a parent, brother, or sister with an immune system problem?   No   In the past 3 months, have you taken medications that affect  your immune system, such as prednisone, other steroids, or anticancer drugs; drugs for the treatment of rheumatoid arthritis, Crohn s disease, or psoriasis; or have you had radiation treatments?   No   Have you had a seizure, or a brain or other nervous system problem?   No   During the past year, have you received a transfusion of blood or blood    products, or been given immune (gamma) globulin or antiviral drug?   No   For women: Are you pregnant or is there a chance you could become       pregnant during the next month?   No   Have you received any vaccinations in the past 4 weeks?    No     Immunization questionnaire answers were all negative.    I have reviewed the following standing orders:   This patient is due and qualifies for the Covid-19 vaccine.     Click here for COVID-19 Standing Order    I have reviewed the vaccines inclusion and exclusion criteria; No concerns regarding eligibility.     This patient is due and qualifies for the Influenza vaccine.    Click here for Influenza Vaccine Standing Order    I have reviewed the vaccines inclusion and exclusion criteria; No concerns regarding eligibility.         This patient is due and qualifies for the RSV vaccine.    Click here for RSV Vaccine Standing Order    I have reviewed the vaccines inclusion and exclusion criteria; No concerns regarding eligibility.     Patient instructed to remain in clinic for 15 minutes afterwards, and to report any adverse reactions.     Screening performed by Jasmina Thompson MA on 11/3/2023 at 12:48 PM.     well

## 2023-11-30 DIAGNOSIS — I10 HTN, GOAL BELOW 140/90: ICD-10-CM

## 2023-11-30 RX ORDER — AMLODIPINE BESYLATE 10 MG/1
10 TABLET ORAL DAILY
Qty: 90 TABLET | Refills: 0 | Status: SHIPPED | OUTPATIENT
Start: 2023-11-30 | End: 2024-02-28

## 2023-11-30 RX ORDER — LISINOPRIL 40 MG/1
40 TABLET ORAL DAILY
Qty: 90 TABLET | Refills: 0 | Status: SHIPPED | OUTPATIENT
Start: 2023-11-30 | End: 2024-02-28

## 2024-02-17 ENCOUNTER — HEALTH MAINTENANCE LETTER (OUTPATIENT)
Age: 69
End: 2024-02-17

## 2024-02-28 DIAGNOSIS — I10 HTN, GOAL BELOW 140/90: ICD-10-CM

## 2024-02-28 RX ORDER — LISINOPRIL 40 MG/1
40 TABLET ORAL DAILY
Qty: 90 TABLET | Refills: 0 | Status: SHIPPED | OUTPATIENT
Start: 2024-02-28 | End: 2024-04-16

## 2024-02-28 RX ORDER — AMLODIPINE BESYLATE 10 MG/1
10 TABLET ORAL DAILY
Qty: 90 TABLET | Refills: 0 | Status: SHIPPED | OUTPATIENT
Start: 2024-02-28 | End: 2024-04-16

## 2024-02-28 NOTE — TELEPHONE ENCOUNTER
Appointments in Next Year      Apr 16, 2024  1:30 PM  (Arrive by 1:10 PM)  Annual Wellness Visit with HAVEN Morgan CNP  [facility] ([facility] ) [phone]

## 2024-04-16 ENCOUNTER — OFFICE VISIT (OUTPATIENT)
Dept: FAMILY MEDICINE | Facility: CLINIC | Age: 69
End: 2024-04-16
Payer: MEDICARE

## 2024-04-16 VITALS
OXYGEN SATURATION: 94 % | HEIGHT: 67 IN | BODY MASS INDEX: 33.43 KG/M2 | DIASTOLIC BLOOD PRESSURE: 79 MMHG | RESPIRATION RATE: 20 BRPM | SYSTOLIC BLOOD PRESSURE: 138 MMHG | WEIGHT: 213 LBS | HEART RATE: 80 BPM | TEMPERATURE: 98.2 F

## 2024-04-16 DIAGNOSIS — Z13.0 SCREENING FOR DISORDER OF BLOOD AND BLOOD-FORMING ORGANS: ICD-10-CM

## 2024-04-16 DIAGNOSIS — Z12.5 SCREENING FOR PROSTATE CANCER: ICD-10-CM

## 2024-04-16 DIAGNOSIS — Z87.891 PERSONAL HISTORY OF NICOTINE DEPENDENCE: ICD-10-CM

## 2024-04-16 DIAGNOSIS — R73.01 ELEVATED FASTING GLUCOSE: ICD-10-CM

## 2024-04-16 DIAGNOSIS — R79.89 ELEVATED LFTS: ICD-10-CM

## 2024-04-16 DIAGNOSIS — Z13.29 SCREENING FOR THYROID DISORDER: ICD-10-CM

## 2024-04-16 DIAGNOSIS — Z13.1 SCREENING FOR DIABETES MELLITUS (DM): ICD-10-CM

## 2024-04-16 DIAGNOSIS — I71.40 ABDOMINAL AORTIC ANEURYSM (AAA) 3.0 CM TO 5.5 CM IN DIAMETER IN MALE (H): ICD-10-CM

## 2024-04-16 DIAGNOSIS — Z00.00 ENCOUNTER FOR MEDICARE ANNUAL WELLNESS EXAM: Primary | ICD-10-CM

## 2024-04-16 DIAGNOSIS — Z13.6 CARDIOVASCULAR SCREENING; LDL GOAL LESS THAN 130: ICD-10-CM

## 2024-04-16 DIAGNOSIS — I77.811 ECTATIC ABDOMINAL AORTA (H): ICD-10-CM

## 2024-04-16 DIAGNOSIS — Z12.2 ENCOUNTER FOR SCREENING FOR LUNG CANCER: ICD-10-CM

## 2024-04-16 DIAGNOSIS — I10 HTN, GOAL BELOW 140/90: ICD-10-CM

## 2024-04-16 LAB
ERYTHROCYTE [DISTWIDTH] IN BLOOD BY AUTOMATED COUNT: 12.1 % (ref 10–15)
HBA1C MFR BLD: 5.7 % (ref 0–5.6)
HCT VFR BLD AUTO: 45.4 % (ref 40–53)
HGB BLD-MCNC: 15.6 G/DL (ref 13.3–17.7)
MCH RBC QN AUTO: 32.9 PG (ref 26.5–33)
MCHC RBC AUTO-ENTMCNC: 34.4 G/DL (ref 31.5–36.5)
MCV RBC AUTO: 96 FL (ref 78–100)
PLATELET # BLD AUTO: 288 10E3/UL (ref 150–450)
RBC # BLD AUTO: 4.74 10E6/UL (ref 4.4–5.9)
WBC # BLD AUTO: 9.4 10E3/UL (ref 4–11)

## 2024-04-16 PROCEDURE — 84443 ASSAY THYROID STIM HORMONE: CPT | Performed by: NURSE PRACTITIONER

## 2024-04-16 PROCEDURE — G0439 PPPS, SUBSEQ VISIT: HCPCS | Performed by: NURSE PRACTITIONER

## 2024-04-16 PROCEDURE — 82570 ASSAY OF URINE CREATININE: CPT | Performed by: NURSE PRACTITIONER

## 2024-04-16 PROCEDURE — 83036 HEMOGLOBIN GLYCOSYLATED A1C: CPT | Performed by: NURSE PRACTITIONER

## 2024-04-16 PROCEDURE — 80061 LIPID PANEL: CPT | Performed by: NURSE PRACTITIONER

## 2024-04-16 PROCEDURE — 85027 COMPLETE CBC AUTOMATED: CPT | Performed by: NURSE PRACTITIONER

## 2024-04-16 PROCEDURE — 36415 COLL VENOUS BLD VENIPUNCTURE: CPT | Performed by: NURSE PRACTITIONER

## 2024-04-16 PROCEDURE — G0103 PSA SCREENING: HCPCS | Performed by: NURSE PRACTITIONER

## 2024-04-16 PROCEDURE — 80053 COMPREHEN METABOLIC PANEL: CPT | Performed by: NURSE PRACTITIONER

## 2024-04-16 PROCEDURE — 99214 OFFICE O/P EST MOD 30 MIN: CPT | Mod: 25 | Performed by: NURSE PRACTITIONER

## 2024-04-16 PROCEDURE — 82043 UR ALBUMIN QUANTITATIVE: CPT | Performed by: NURSE PRACTITIONER

## 2024-04-16 RX ORDER — AMLODIPINE BESYLATE 10 MG/1
10 TABLET ORAL DAILY
Qty: 90 TABLET | Refills: 3 | Status: SHIPPED | OUTPATIENT
Start: 2024-04-16 | End: 2024-09-25

## 2024-04-16 RX ORDER — LISINOPRIL 40 MG/1
40 TABLET ORAL DAILY
Qty: 90 TABLET | Refills: 3 | Status: SHIPPED | OUTPATIENT
Start: 2024-04-16 | End: 2024-09-25

## 2024-04-16 ASSESSMENT — PAIN SCALES - GENERAL: PAINLEVEL: NO PAIN (0)

## 2024-04-16 ASSESSMENT — ACTIVITIES OF DAILY LIVING (ADL): CURRENT_FUNCTION: NO ASSISTANCE NEEDED

## 2024-04-16 NOTE — PATIENT INSTRUCTIONS
PLAN:   1.   Symptomatic therapy suggested: Continue current medications as prescribed.   2.  Orders Placed This Encounter   Medications    amLODIPine (NORVASC) 10 MG tablet     Sig: Take 1 tablet (10 mg) by mouth daily     Dispense:  90 tablet     Refill:  3    lisinopril (ZESTRIL) 40 MG tablet     Sig: Take 1 tablet (40 mg) by mouth daily     Dispense:  90 tablet     Refill:  3     Orders Placed This Encounter   Procedures    REVIEW OF HEALTH MAINTENANCE PROTOCOL ORDERS    CT Chest Lung Cancer Scrn Low Dose wo    US Abdominal Aorta    Lipid panel reflex to direct LDL Fasting    Albumin Random Urine Quantitative with Creat Ratio    Comprehensive metabolic panel    CBC with platelets    Prostate Specific Antigen Screen    TSH with free T4 reflex    Hemoglobin A1c       3.  FURTHER TESTING:       - abdominal aorta  ultrasound       - CT chest for lung cancer   Will follow up and/or notify patient of  results via My Chart to determine further need for followup   Work on weight loss  Regular exercise  Follow up office visit in one year for annual health maintenance exam, sooner PRN.   Patient needs to follow up in if no improvement,or sooner if worsening of symptoms or other symptoms develop.  Preventive Care Advice   This is general advice given by our system to help you stay healthy. However, your care team may have specific advice just for you. Please talk to your care team about your preventive care needs.  Nutrition  Eat 5 or more servings of fruits and vegetables each day.  Try wheat bread, brown rice and whole grain pasta (instead of white bread, rice, and pasta).  Get enough calcium and vitamin D. Check the label on foods and aim for 100% of the RDA (recommended daily allowance).  Lifestyle  Exercise at least 150 minutes each week   (30 minutes a day, 5 days a week).  Do muscle strengthening activities 2 days a week. These help control your weight and prevent disease.  No smoking.  Wear sunscreen to prevent  skin cancer.  Have a dental exam and cleaning every 6 months.  Yearly exams  See your health care team every year to talk about:  Any changes in your health.  Any medicines your care team has prescribed.  Preventive care, family planning, and ways to prevent chronic diseases.  Shots (vaccines)   HPV shots (up to age 26), if you've never had them before.  Hepatitis B shots (up to age 59), if you've never had them before.  COVID-19 shot: Get this shot when it's due.  Flu shot: Get a flu shot every year.  Tetanus shot: Get a tetanus shot every 10 years.  Pneumococcal, hepatitis A, and RSV shots: Ask your care team if you need these based on your risk.  Shingles shot (for age 50 and up).  General health tests  Diabetes screening:  Starting at age 35, Get screened for diabetes at least every 3 years.  If you are younger than age 35, ask your care team if you should be screened for diabetes.  Cholesterol test: At age 39, start having a cholesterol test every 5 years, or more often if advised.  Bone density scan (DEXA): At age 50, ask your care team if you should have this scan for osteoporosis (brittle bones).  Hepatitis C: Get tested at least once in your life.  STIs (sexually transmitted infections)  Before age 24: Ask your care team if you should be screened for STIs.  After age 24: Get screened for STIs if you're at risk. You are at risk for STIs (including HIV) if:  You are sexually active with more than one person.  You don't use condoms every time.  You or a partner was diagnosed with a sexually transmitted infection.  If you are at risk for HIV, ask about PrEP medicine to prevent HIV.  Get tested for HIV at least once in your life, whether you are at risk for HIV or not.  Cancer screening tests  Cervical cancer screening: If you have a cervix, begin getting regular cervical cancer screening tests at age 21. Most people who have regular screenings with normal results can stop after age 65. Talk about this with your  provider.  Breast cancer scan (mammogram): If you've ever had breasts, begin having regular mammograms starting at age 40. This is a scan to check for breast cancer.  Colon cancer screening: It is important to start screening for colon cancer at age 45.  Have a colonoscopy test every 10 years (or more often if you're at risk) Or, ask your provider about stool tests like a FIT test every year or Cologuard test every 3 years.  To learn more about your testing options, visit: https://www.[employer]/356412.pdf.  For help making a decision, visit: https://bit.[employer]/lm90871.  Prostate cancer screening test: If you have a prostate and are age 55 to 69, ask your provider if you would benefit from a yearly prostate cancer screening test.  Lung cancer screening: If you are a current or former smoker age 50 to 80, ask your care team if ongoing lung cancer screenings are right for you.  For informational purposes only. Not to replace the advice of your health care provider. Copyright   2023 [employer]. All rights reserved. Clinically reviewed by the [facility] Transitions Program. 1Mind 385295 - REV 01/24.    Hearing Loss: Care Instructions  Overview     Hearing loss is a sudden or slow decrease in how well you hear. It can range from slight to profound. Permanent hearing loss can occur with aging. It also can happen when you are exposed long-term to loud noise. Examples include listening to loud music, riding motorcycles, or being around other loud machines.  Hearing loss can affect your work and home life. It can make you feel lonely or depressed. You may feel that you have lost your independence. But hearing aids and other devices can help you hear better and feel connected to others.  Follow-up care is a key part of your treatment and safety. Be sure to make and go to all appointments, and call your doctor if you are having problems. It's also a good idea to know your test results and keep a list of  the medicines you take.  How can you care for yourself at home?  Avoid loud noises whenever possible. This helps keep your hearing from getting worse.  Always wear hearing protection around loud noises.  Wear a hearing aid as directed.  A professional can help you pick a hearing aid that will work best for you.  You can also get hearing aids over the counter for mild to moderate hearing loss.  Have hearing tests as your doctor suggests. They can show whether your hearing has changed. Your hearing aid may need to be adjusted.  Use other devices as needed. These may include:  Telephone amplifiers and hearing aids that can connect to a television, stereo, radio, or microphone.  Devices that use lights or vibrations. These alert you to the doorbell, a ringing telephone, or a baby monitor.  Television closed-captioning. This shows the words at the bottom of the screen. Most new TVs can do this.  TTY (text telephone). This lets you type messages back and forth on the telephone instead of talking or listening. These devices are also called TDD. When messages are typed on the keyboard, they are sent over the phone line to a receiving TTY. The message is shown on a monitor.  Use text messaging, social media, and email if it is hard for you to communicate by telephone.  Try to learn a listening technique called speechreading. It is not lipreading. You pay attention to people's gestures, expressions, posture, and tone of voice. These clues can help you understand what a person is saying. Face the person you are talking to, and have them face you. Make sure the lighting is good. You need to see the other person's face clearly.  Think about counseling if you need help to adjust to your hearing loss.  When should you call for help?  Watch closely for changes in your health, and be sure to contact your doctor if:    You think your hearing is getting worse.     You have new symptoms, such as dizziness or nausea.   Where can you  "learn more?  Go to https://www.[employer].net/patiented  Enter R798 in the search box to learn more about \"Hearing Loss: Care Instructions.\"  Current as of: September 27, 2023               Content Version: 14.0    5096-7503 [employer].   Care instructions adapted under license by your healthcare professional. If you have questions about a medical condition or this instruction, always ask your healthcare professional. [employer] disclaims any warranty or liability for your use of this information.      "

## 2024-04-16 NOTE — PROGRESS NOTES
Preventive Care Visit  [facility]  Izabela Mari Moulton APRN CNP, Internal Medicine - Pediatrics  Apr 16, 2024      Assessment & Plan     Encounter for Medicare annual wellness exam  - REVIEW OF HEALTH MAINTENANCE PROTOCOL ORDERS    HTN, goal below 140/90  HTN Plan:  1)  Medication: continue current medication regimen unchanged  2)  Dietary sodium restriction  3)  Regular aerobic exercise  4)  Recheck in 1 year, sooner should new symptoms or   problems arise.  5) See todays orders.  Patient Education: Reviewed risks of hypertension and principles of   treatment.  - Albumin Random Urine Quantitative with Creat Ratio  - Comprehensive metabolic panel  - amLODIPine (NORVASC) 10 MG tablet  Dispense: 90 tablet; Refill: 3  - lisinopril (ZESTRIL) 40 MG tablet  Dispense: 90 tablet; Refill: 3  - Albumin Random Urine Quantitative with Creat Ratio  - Comprehensive metabolic panel    Screening for diabetes mellitus (DM)  - Comprehensive metabolic panel    Screening for prostate cancer  - Prostate Specific Antigen Screen    Elevated fasting glucose  - Hemoglobin A1c    Screening for thyroid disorder  - TSH with free T4 reflex    Screening for disorder of blood and blood-forming organs  - CBC with platelets    CARDIOVASCULAR SCREENING; LDL GOAL LESS THAN 130  - Lipid panel reflex to direct LDL Fasting    Encounter for screening for lung cancer   CT Chest Lung Cancer Scrn Low Dose wo    Personal history of nicotine dependence  - CT Chest Lung Cancer Scrn Low Dose wo    Ectatic abdominal aorta (H24)  - US Abdominal Aorta    Elevated LFTs  - Comprehensive metabolic panel    Abdominal aortic aneurysm (AAA) 3.0 cm to 5.5 cm in diameter in male (H24)  - Vascular Surgery Referral      Ordering of each unique test  No LOS data to display   Time spent by me doing chart review, history and exam, documentation and further activities per the note      Nicotine/Tobacco Cessation  He reports that he has been smoking  "cigarettes. He has a 25 pack-year smoking history. He has never used smokeless tobacco.  Nicotine/Tobacco Cessation Plan  Information offered: Patient not interested at this time      BMI  Estimated body mass index is 33.36 kg/m  as calculated from the following:    Height as of this encounter: 1.702 m (5' 7\").    Weight as of this encounter: 96.6 kg (213 lb).   Weight management plan: Discussed healthy diet and exercise guidelines      FUTURE APPOINTMENTS:       - Follow-up for annual visit or as needed  See Patient Instructions    Caio Rutledge is a 68 year old, presenting for the following:  Physical (Wellness)        4/16/2024     1:13 PM   Additional Questions   Roomed by Elizabet MILLER   Accompanied by self       Health Care Directive  Patient does not have a Health Care Directive or Living Will: Discussed advance care planning with patient; information given to patient to review.    Healthy Habits:     In general, how would you rate your overall health?  Good    Frequency of exercise:  2-3 days/week    Duration of exercise:  30-45 minutes    Do you usually eat at least 4 servings of fruit and vegetables a day, include whole grains    & fiber and avoid regularly eating high fat or \"junk\" foods?  No    Taking medications regularly:  Yes    Barriers to taking medications:  None    Medication side effects:  None    Ability to successfully perform activities of daily living:  No assistance needed    Home Safety:  No safety concerns identified    Hearing Impairment:  No hearing concerns    In the past 6 months, have you been bothered by leaking of urine?  No    In general, how would you rate your overall mental or emotional health?  Good    Additional concerns today:  No      Hypertension Follow-up    Do you check your blood pressure regularly outside of the clinic? No   Are you following a low salt diet? No  Are your blood pressures ever more than 140 on the top number (systolic) OR more   than 90 on the bottom " number (diastolic), for example 140/90? No        12/11/2022   General Health   How would you rate your overall physical health? Good         12/11/2022   Nutrition   At least 4 servings of fruits and vegetables/day No         12/11/2022   Exercise   Frequency of exercise: 2-3 days/week            4/15/2024   Fall Risk   Fallen 2 or more times in the past year? No   Trouble with walking or balance? No          12/11/2022   Activities of Daily Living- Home Safety   Needs help with the following daily activites NO assistance is needed   Safety concerns in the home None of the above               12/11/2022   Hearing Screening   Hearing concerns? No concerns       Today's PHQ-2 Score:       4/15/2024     3:49 PM   PHQ-2 ( 1999 [employer])   Q1: Little interest or pleasure in doing things 0   Q2: Feeling down, depressed or hopeless 0   PHQ-2 Score 0   Q1: Little interest or pleasure in doing things Not at all   Q2: Feeling down, depressed or hopeless Not at all   PHQ-2 Score 0           12/11/2022   Substance Use   Alcohol more than 3/day or more than 7/wk Yes   How often do you have a drink containing alcohol 4 or more times a week   How many alcohol drinks on typical day 3 or 4   How often do you have 5+ drinks at one occasion Weekly   Audit 2/3 Score 4   How often not able to stop drinking once started Less than monthly   How often failed to do what normally expected Never   How often needed first drink in am after a heavy drinking session Never   How often feeling of guilt or remorse after drinking Never   How often unable to remember what happened the night before Never   Have you or someone else been injured because of your drinking No   Has anyone been concerned or suggested you cut down on drinking No   TOTAL SCORE - AUDIT 9     Social History     Tobacco Use    Smoking status: Every Day     Current packs/day: 1.00     Average packs/day: 1 pack/day for 25.0 years (25.0 ttl pk-yrs)     Types: Cigarettes    Smokeless  tobacco: Never   Vaping Use    Vaping status: Never Used   Substance Use Topics    Alcohol use: Yes     Comment: 2-3 beers/day     Drug use: No       Last PSA:   PSA   Date Value Ref Range Status   06/04/2021 1.92 0 - 4 ug/L Final     Comment:     Assay Method:  Chemiluminescence using Siemens Vista analyzer     Prostate Specific Antigen Screen   Date Value Ref Range Status   12/12/2022 2.80 0.00 - 4.00 ug/L Final     ASCVD Risk   The 10-year ASCVD risk score (Alexander CERON, et al., 2019) is: 24.9%    Values used to calculate the score:      Age: 68 years      Sex: Male      Is Non- : No      Diabetic: No      Tobacco smoker: Yes      Systolic Blood Pressure: 138 mmHg      Is BP treated: Yes      HDL Cholesterol: 53 mg/dL      Total Cholesterol: 178 mg/dL          Reviewed and updated as needed this visit by Provider                    Past Medical History:   Diagnosis Date    Hypertension     NO ACTIVE PROBLEMS      Past Surgical History:   Procedure Laterality Date    COLONOSCOPY N/A 4/28/2023    Procedure: COLONOSCOPY, FLEXIBLE, WITH LESION REMOVAL USING SNARE;  Surgeon: Blair Stack MD;  Location: [facility] OR    COLONOSCOPY WITH CO2 INSUFFLATION N/A 7/30/2021    Procedure: COLONOSCOPY, WITH CO2 INSUFFLATION;  Surgeon: Albania Puentes DO;  Location: [facility] OR    COLONOSCOPY WITH CO2 INSUFFLATION N/A 4/28/2023    Procedure: Colonoscopy with CO2 insufflation;  Surgeon: Blair Stack MD;  Location: [facility] OR    HERNIA REPAIR  1974    age 17--right    LAPAROTOMY EXPLORATORY  1998    RLQ abcess    ROTATOR CUFF REPAIR RT/LT  2002    left      Lab work is in process  Labs reviewed in EPIC  BP Readings from Last 3 Encounters:   04/16/24 138/79   04/28/23 131/84   12/12/22 (!) 158/84    Wt Readings from Last 3 Encounters:   04/16/24 96.6 kg (213 lb)   12/12/22 97.3 kg (214 lb 8 oz)   09/29/21 90.8 kg (200 lb 1.6 oz)                  Patient Active Problem List   Diagnosis    Advanced  directives, counseling/discussion    Hyperlipidemia LDL goal <130    Obesity (BMI 30-39.9)    Tobacco use disorder    Elevated fasting glucose    Erectile dysfunction, unspecified erectile dysfunction type    Vitamin D deficiency    Muscle cramp, nocturnal    WILL (obstructive sleep apnea)- mild (AHI 7)    Ectatic abdominal aorta (H24)     Past Surgical History:   Procedure Laterality Date    COLONOSCOPY N/A 4/28/2023    Procedure: COLONOSCOPY, FLEXIBLE, WITH LESION REMOVAL USING SNARE;  Surgeon: Blair Stack MD;  Location: [facility]    COLONOSCOPY WITH CO2 INSUFFLATION N/A 7/30/2021    Procedure: COLONOSCOPY, WITH CO2 INSUFFLATION;  Surgeon: Albania Puentes DO;  Location: [facility]    COLONOSCOPY WITH CO2 INSUFFLATION N/A 4/28/2023    Procedure: Colonoscopy with CO2 insufflation;  Surgeon: Blair Stack MD;  Location: [facility]    HERNIA REPAIR  1974    age 17--right    LAPAROTOMY EXPLORATORY  1998    RLQ abcess    ROTATOR CUFF REPAIR RT/LT  2002    left        Social History     Tobacco Use    Smoking status: Every Day     Current packs/day: 1.00     Average packs/day: 1 pack/day for 25.0 years (25.0 ttl pk-yrs)     Types: Cigarettes    Smokeless tobacco: Never   Substance Use Topics    Alcohol use: Yes     Comment: 2-3 beers/day      Family History   Problem Relation Age of Onset    Unknown/Adopted Daughter     Hypertension Mother     Eye Disorder Mother         cataracts, glucoma     Diabetes No family hx of     Coronary Artery Disease No family hx of     Colon Cancer No family hx of          Current Outpatient Medications   Medication Sig Dispense Refill    amLODIPine (NORVASC) 10 MG tablet Take 1 tablet (10 mg) by mouth daily 90 tablet 3    IBUPROFEN PO Take by mouth as needed for moderate pain      lisinopril (ZESTRIL) 40 MG tablet Take 1 tablet (40 mg) by mouth daily 90 tablet 3     No Known Allergies  Current providers sharing in care for this patient include:  Patient Care Team:  No Ref-Primary,  Physician as PCP - Izabela Spears APRN CNP as Assigned PCP    The following health maintenance items are reviewed in Epic and correct as of today:  Health Maintenance   Topic Date Due    COVID-19 Vaccine (6 - 2023-24 season) 03/03/2024    ZOSTER IMMUNIZATION (1 of 2) 04/01/2025 (Originally 9/28/2004)    NICOTINE/TOBACCO CESSATION COUNSELING Q 1 YR  04/16/2025    MEDICARE ANNUAL WELLNESS VISIT  04/16/2025    LIPID  04/16/2025    ANNUAL REVIEW OF HM ORDERS  04/16/2025    FALL RISK ASSESSMENT  04/16/2025    LUNG CANCER SCREENING  04/29/2025    GLUCOSE  04/16/2027    ADVANCE CARE PLANNING  12/19/2027    COLORECTAL CANCER SCREENING  04/28/2028    DTAP/TDAP/TD IMMUNIZATION (2 - Td or Tdap) 04/27/2029    HEPATITIS C SCREENING  Completed    PHQ-2 (once per calendar year)  Completed    INFLUENZA VACCINE  Completed    Pneumococcal Vaccine: 65+ Years  Completed    RSV VACCINE (Pregnancy & 60+)  Completed    AORTIC ANEURYSM SCREENING (SYSTEM ASSIGNED)  Completed    HPV IMMUNIZATION  Aged Out    MENINGITIS IMMUNIZATION  Aged Out    RSV MONOCLONAL ANTIBODY  Aged Out    IPV IMMUNIZATION  Discontinued       ROS:  CONSTITUTIONAL:NEGATIVE for fever, chills, change in weight  INTEGUMENTARY/SKIN: NEGATIVE for worrisome rashes, moles or lesions  EYES: NEGATIVE for vision changes or irritation  ENT: NEGATIVE for ear, mouth and throat problems  RESP:NEGATIVE for significant cough or SOB  CV: NEGATIVE for chest pain, palpitations or peripheral edema  GI: NEGATIVE for nausea, abdominal pain, heartburn, or change in bowel habits   male: negative for dysuria, hematuria, decreased urinary stream, erectile dysfunction, urethral discharge  MUSCULOSKELETAL:NEGATIVE for significant arthralgias or myalgia  NEURO: NEGATIVE for weakness, dizziness or paresthesias  ENDOCRINE: NEGATIVE for temperature intolerance, skin/hair changes  HEME/ALLERGY/IMMUNE: NEGATIVE for bleeding problems  PSYCHIATRIC: NEGATIVE for changes in mood or  "affect         Objective    Exam  /79 (BP Location: Right arm, Patient Position: Sitting, Cuff Size: Adult Large)   Pulse 80   Temp 98.2  F (36.8  C) (Oral)   Resp 20   Ht 1.702 m (5' 7\")   Wt 96.6 kg (213 lb)   SpO2 94%   BMI 33.36 kg/m     Estimated body mass index is 33.36 kg/m  as calculated from the following:    Height as of this encounter: 1.702 m (5' 7\").    Weight as of this encounter: 96.6 kg (213 lb).    Physical Exam  GENERAL: healthy, alert, no distress, and obese  EYES: Eyes grossly normal to inspection and conjunctivae and sclerae normal  HENT: ear canals and TM's normal, nose and mouth without ulcers or lesions  NECK: no adenopathy, no asymmetry, masses, or scars  RESP: lungs clear to auscultation - no rales, rhonchi or wheezes  CV: regular rates and rhythm, no murmur, click or rub, peripheral pulses strong, and no peripheral edema  ABDOMEN: soft, nontender, no hepatosplenomegaly, no masses and bowel sounds normal   (male): no hernias  MS: no gross musculoskeletal defects noted, no edema  SKIN: no suspicious lesions or rashes  NEURO: Normal strength and tone, mentation intact and speech normal  PSYCH: mentation appears normal, affect normal/bright  LYMPH: no cervical, supraclavicular, axillary, or inguinal adenopathy        4/16/2024   Mini Cog   Clock Draw Score 2 Normal   3 Item Recall 3 objects recalled   Mini Cog Total Score 5            Signed Electronically by: HAVEN Morgan CNP    "

## 2024-04-17 LAB
ALBUMIN SERPL BCG-MCNC: 4.6 G/DL (ref 3.5–5.2)
ALP SERPL-CCNC: 62 U/L (ref 40–150)
ALT SERPL W P-5'-P-CCNC: 58 U/L (ref 0–70)
ANION GAP SERPL CALCULATED.3IONS-SCNC: 9 MMOL/L (ref 7–15)
AST SERPL W P-5'-P-CCNC: 50 U/L (ref 0–45)
BILIRUB SERPL-MCNC: 0.5 MG/DL
BUN SERPL-MCNC: 7.8 MG/DL (ref 8–23)
CALCIUM SERPL-MCNC: 9.5 MG/DL (ref 8.8–10.2)
CHLORIDE SERPL-SCNC: 97 MMOL/L (ref 98–107)
CHOLEST SERPL-MCNC: 169 MG/DL
CREAT SERPL-MCNC: 1.03 MG/DL (ref 0.67–1.17)
CREAT UR-MCNC: 136 MG/DL
DEPRECATED HCO3 PLAS-SCNC: 28 MMOL/L (ref 22–29)
EGFRCR SERPLBLD CKD-EPI 2021: 79 ML/MIN/1.73M2
FASTING STATUS PATIENT QL REPORTED: NO
GLUCOSE SERPL-MCNC: 104 MG/DL (ref 70–99)
HDLC SERPL-MCNC: 40 MG/DL
LDLC SERPL CALC-MCNC: 91 MG/DL
MICROALBUMIN UR-MCNC: 63 MG/L
MICROALBUMIN/CREAT UR: 46.32 MG/G CR (ref 0–17)
NONHDLC SERPL-MCNC: 129 MG/DL
POTASSIUM SERPL-SCNC: 4.9 MMOL/L (ref 3.4–5.3)
PROT SERPL-MCNC: 7.3 G/DL (ref 6.4–8.3)
PSA SERPL DL<=0.01 NG/ML-MCNC: 2.42 NG/ML (ref 0–4.5)
SODIUM SERPL-SCNC: 134 MMOL/L (ref 135–145)
TRIGL SERPL-MCNC: 189 MG/DL
TSH SERPL DL<=0.005 MIU/L-ACNC: 2 UIU/ML (ref 0.3–4.2)

## 2024-04-17 NOTE — RESULT ENCOUNTER NOTE
Allie Wilkins,    Attached are your test results.  -Normal red blood cell (hgb) levels, normal white blood cell count and normal platelet levels.  -A1C  is slight elevated and may be a sign of early diabetes (prediabetes). ADVISE:: eating a low carbohydrate diet, exercising, trying to lose weight (if necessary) and rechecking your glucose level in 12 months.   Please contact us if you have any questions.    Izabela Moulton, CNP

## 2024-04-20 NOTE — RESULT ENCOUNTER NOTE
Allie Wilkins,    Attached are your test results.  -PSA (prostate specific antigen) test is normal.  -Triglycerides are elevated which can increase your heart disease risk.  A diet high in fat and simple carbohydrates, genetics and being overweight can contribute to this.  Your LDL(bad) cholesterol and HDL(good) cholesterol levels are normal.  ADVISE: exercising 150 minutes of aerobic exercise per week (30 minutes 5 days per week or 50 minutes 3 days per week are options), weight control, and omega-3 fatty acids (fish oil)daily are helpful to improve this.  In 12 months, you should recheck your fasting cholesterol panel by scheduling a lab-only appointment.  -Liver and gallbladder tests (ALT,AST, Alk phos,bilirubin) are significantly elevated. ADVISE: slightly elevated will also recheck this not fasting as well in a month   -Kidney function (GFR) is normal.  -Sodium is decreased.  ADVISE: rechecking this in 1 month.  -Potassium is normal.  -Calcium is normal.  -Glucose is slight elevated and may be a sign of early diabetes (prediabetes). ADVISE:: eating a low carbohydrate diet, exercising, trying to lose weight (if necessary) and rechecking your glucose level in 12 months.  -Microalbumin (urine protein) level is elevated. This is suggestive of early damage to your kidneys from high blood pressure.  ADVISE: avoiding anti-inflamatory agents such as ibuprofen (Advil, Motrin) or naproxen (Aleve) as much as possible, keeping your blood pressure in a normal range, and continuing your medication (lisinopril) that helps protect your kidneys.  Also, this should be rechecked in 1 year.       -TSH (thyroid stimulating hormone) level is normal which indicates normal thyroid function.   Please contact us if you have any questions.    Izabela Moulton, CNP

## 2024-04-29 ENCOUNTER — ANCILLARY PROCEDURE (OUTPATIENT)
Dept: ULTRASOUND IMAGING | Facility: CLINIC | Age: 69
End: 2024-04-29
Attending: NURSE PRACTITIONER
Payer: MEDICARE

## 2024-04-29 ENCOUNTER — ANCILLARY PROCEDURE (OUTPATIENT)
Dept: CT IMAGING | Facility: CLINIC | Age: 69
End: 2024-04-29
Attending: NURSE PRACTITIONER
Payer: MEDICARE

## 2024-04-29 DIAGNOSIS — I77.811 ECTATIC ABDOMINAL AORTA (H): ICD-10-CM

## 2024-04-29 DIAGNOSIS — Z87.891 PERSONAL HISTORY OF NICOTINE DEPENDENCE: ICD-10-CM

## 2024-04-29 DIAGNOSIS — Z12.2 ENCOUNTER FOR SCREENING FOR LUNG CANCER: ICD-10-CM

## 2024-04-29 PROCEDURE — 76775 US EXAM ABDO BACK WALL LIM: CPT | Performed by: RADIOLOGY

## 2024-04-29 PROCEDURE — 71271 CT THORAX LUNG CANCER SCR C-: CPT | Performed by: RADIOLOGY

## 2024-04-30 ENCOUNTER — TELEPHONE (OUTPATIENT)
Dept: OTHER | Facility: CLINIC | Age: 69
End: 2024-04-30
Payer: MEDICARE

## 2024-04-30 ENCOUNTER — TELEPHONE (OUTPATIENT)
Dept: FAMILY MEDICINE | Facility: CLINIC | Age: 69
End: 2024-04-30
Payer: MEDICARE

## 2024-04-30 NOTE — RESULT ENCOUNTER NOTE
Allie Wilkins,    Attached are your test results.  -Lung CT did not show any signs of suspicious lung nodules. ADVISE: Rechecking a lung CT scan in 12 months.  However it did also  that you have some fatty liver type of changes. This can be related to being overweight and also extended use of alcohol. Would recommend decreasing your alcohol intake    Please contact us if you have any questions.    Izabela Moulton, CNP

## 2024-04-30 NOTE — TELEPHONE ENCOUNTER
Future Appointments   Date Time Provider Department Center   5/23/2024  3:30 PM Talha Groves MD [facility]

## 2024-04-30 NOTE — TELEPHONE ENCOUNTER
Miguel Angel is calling to report an incidental finding on CT chest lung cancer screen. Miguel Angel reported finding below:    2. Significant Incidental Finding(s):  Category S: Yes.  a.  Hepatic steatosis     Routing to provider to review and advise.    CHRISTIAN Lopez  [facility]

## 2024-04-30 NOTE — TELEPHONE ENCOUNTER
Huddled with provider in clinic. Provider confirmed message sent to patient.     CHRISTIAN Lopez  [facility]

## 2024-04-30 NOTE — TELEPHONE ENCOUNTER
Referral received via [employer] on 04/30/24.    Pt referred to [facility] by Izabela Moulton APRN CNP for Abdominal aortic aneurysm (AAA) 3.0 cm to 5.5 cm in diameter in male (H24)     Abdominal aorta ultrasound 4/29/24:    Maximal AP diameter of the infrarenal abdominal aorta is 3.1, which is  aneurysmal. 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 The There has been interval growth in  size of these vessels and further workup with consultation with  vascular surgery should be considered.      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

## 2024-04-30 NOTE — TELEPHONE ENCOUNTER
LM for patient to call us back to schedule:    NEW VASCULAR PATIENT consult with Vascular Medicine  Please schedule this at next available

## 2024-04-30 NOTE — RESULT ENCOUNTER NOTE
Allie Wilkins,    Attached are your test results.  You area which was borderline enlarged  is now in the aneurysm range and two other area have increased in size since 2021  Am going to refer you to vascular specialist to help guide how we should continue to watch you or proceed in the future    Please contact us if you have any questions.  Diagnoses: Abdominal aortic aneurysm (AAA) 3.0 cm to 5.5 cm in diameter in male (H24)  Order: Vascular Surgery Referral      [facility]  [address]  [facility] [address]  Phone: [phone]  Fax: [phone]     Comment: Please be aware that coverage of these services is subject to the terms and limitations of your health insurance plan.  Call member services at your health plan with any benefit or coverage questions.  Please call to schedule your appointment      Izabela Moulton CNP

## 2024-05-23 ENCOUNTER — OFFICE VISIT (OUTPATIENT)
Dept: OTHER | Facility: CLINIC | Age: 69
End: 2024-05-23
Attending: INTERNAL MEDICINE
Payer: MEDICARE

## 2024-05-23 VITALS
BODY MASS INDEX: 32.01 KG/M2 | HEART RATE: 79 BPM | DIASTOLIC BLOOD PRESSURE: 77 MMHG | SYSTOLIC BLOOD PRESSURE: 123 MMHG | WEIGHT: 211.2 LBS | HEIGHT: 68 IN | OXYGEN SATURATION: 93 %

## 2024-05-23 DIAGNOSIS — I71.40 ABDOMINAL AORTIC ANEURYSM (AAA) 3.0 CM TO 5.5 CM IN DIAMETER IN MALE (H): ICD-10-CM

## 2024-05-23 DIAGNOSIS — E78.5 HYPERLIPIDEMIA LDL GOAL <70: ICD-10-CM

## 2024-05-23 DIAGNOSIS — I72.3 ILIAC ANEURYSM (H): ICD-10-CM

## 2024-05-23 DIAGNOSIS — Z72.0 TOBACCO ABUSE: ICD-10-CM

## 2024-05-23 DIAGNOSIS — I10 ESSENTIAL HYPERTENSION: ICD-10-CM

## 2024-05-23 DIAGNOSIS — I71.43 INFRARENAL ABDOMINAL AORTIC ANEURYSM (AAA) WITHOUT RUPTURE (H): Primary | ICD-10-CM

## 2024-05-23 DIAGNOSIS — R09.89 ABNORMAL PERIPHERAL PULSE: ICD-10-CM

## 2024-05-23 PROCEDURE — 99205 OFFICE O/P NEW HI 60 MIN: CPT | Mod: 25 | Performed by: INTERNAL MEDICINE

## 2024-05-23 PROCEDURE — G0463 HOSPITAL OUTPT CLINIC VISIT: HCPCS | Performed by: INTERNAL MEDICINE

## 2024-05-23 PROCEDURE — 99406 BEHAV CHNG SMOKING 3-10 MIN: CPT | Performed by: INTERNAL MEDICINE

## 2024-05-23 PROCEDURE — G2211 COMPLEX E/M VISIT ADD ON: HCPCS | Performed by: INTERNAL MEDICINE

## 2024-05-23 RX ORDER — NICOTINE 21 MG/24HR
1 PATCH, TRANSDERMAL 24 HOURS TRANSDERMAL EVERY 24 HOURS
Qty: 30 PATCH | Refills: 0 | Status: SHIPPED | OUTPATIENT
Start: 2024-05-23 | End: 2024-09-25

## 2024-05-23 RX ORDER — NICOTINE 21 MG/24HR
1 PATCH, TRANSDERMAL 24 HOURS TRANSDERMAL EVERY 24 HOURS
Qty: 30 PATCH | Refills: 0 | Status: SHIPPED | OUTPATIENT
Start: 2024-06-22 | End: 2024-09-25

## 2024-05-23 NOTE — PROGRESS NOTES
INITIAL VASCULAR MEDICAL ASSESSMENT  REFERRAL SOURCE: Izabela Moulton APRN CNP   REASON FOR CONSULT: AAA        A/P:    (Z72.0) Tobacco abuse  Comment: I advised he quit and offered him nicotine patches.   Plan: SMOKING CESSATION COUNSELING, 3-10 MIN        He chooses to  utilize patches.           (I71.43) Infrarenal abdominal aortic aneurysm (AAA) without rupture (H24)  (primary encounter diagnosis)  Comment: Small but growing. Does not need surgical intervention at present. Eval. For other sites of aneurysmal ds.   Plan: Echocardiogram Complete. I advised complete tobacco cessation. See below.             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 Does not need surgical intervention at present. Eval. For other sites of aneurysmal ds.   Plan: US Lower Extremity Arterial Duplex Bilateral.I advised complete tobacco cessation. See below.     (R09.89) Abnormal peripheral pulse  Comment: No claudication.  Plan: US Lower Extremity Arterial Duplex Bilateral            (E78.5) Hyperlipidemia LDL goal <70  Comment: Check the below.   Plan: C-Reactive Protein, High Sensitivity,         Comprehensive metabolic panel, LipoFit by NMR,         Lipoprotein (a)            (I10) Essential hypertension  Comment: BP goal < 120/70 secondary to multiple areas of aneurysmal ds.   Plan: Check BP at home, bring home log book in when seen next.       The longitudinal care of plan for Aamir was addressed during this visit. Due to added complexity of care, we will continue to support Aamir Wilkins  and the subsequent management of these conditions and with ongoing continuity of care for these conditions.     68 minutes total medical care on today's date.      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 None of these require surgical intervention at present. He is currently smoking 1 ppd for the last 30  years.       Review Of Systems  Skin: negative  Eyes: negative  Ears/Nose/Throat: negative  Respiratory: No shortness of breath, dyspnea on exertion, cough, or hemoptysis  Cardiovascular: negative  Gastrointestinal: negative  Genitourinary: negative  Musculoskeletal: negative  Neurologic: negative  Psychiatric: negative  Hematologic/Lymphatic/Immunologic: negative  Endocrine: negative      PAST MEDICAL HISTORY:                  Past Medical History:   Diagnosis Date    Hypertension     NO ACTIVE PROBLEMS        PAST SURGICAL HISTORY:                  Past Surgical History:   Procedure Laterality Date    COLONOSCOPY N/A 4/28/2023    Procedure: COLONOSCOPY, FLEXIBLE, WITH LESION REMOVAL USING SNARE;  Surgeon: Blair Stack MD;  Location: [facility] OR    COLONOSCOPY WITH CO2 INSUFFLATION N/A 7/30/2021    Procedure: COLONOSCOPY, WITH CO2 INSUFFLATION;  Surgeon: Albania Puentes DO;  Location: [facility] OR    COLONOSCOPY WITH CO2 INSUFFLATION N/A 4/28/2023    Procedure: Colonoscopy with CO2 insufflation;  Surgeon: Blair Stack MD;  Location: [facility] OR    HERNIA REPAIR  1974    age 17--right    LAPAROTOMY EXPLORATORY  1998    RLQ abcess    ROTATOR CUFF REPAIR RT/LT  2002    left        CURRENT MEDICATIONS:                  Current Outpatient Medications   Medication Sig Dispense Refill    amLODIPine (NORVASC) 10 MG tablet Take 1 tablet (10 mg) by mouth daily 90 tablet 3    IBUPROFEN PO Take by mouth as needed for moderate pain      lisinopril (ZESTRIL) 40 MG tablet Take 1 tablet (40 mg) by mouth daily 90 tablet 3       ALLERGIES:                No Known Allergies    SOCIAL HISTORY:                  Social History     Socioeconomic History    Marital status:      Spouse name: Not on file    Number of children: Not on file    Years of education: Not on file    Highest education level: Not on file   Occupational History    Occupation:  [employer]     Employer: [employer]   Tobacco Use    Smoking status:  Every Day     Current packs/day: 1.00     Average packs/day: 1 pack/day for 25.0 years (25.0 ttl pk-yrs)     Types: Cigarettes    Smokeless tobacco: Never   Vaping Use    Vaping status: Never Used   Substance and Sexual Activity    Alcohol use: Yes     Comment: 2-3 beers/day     Drug use: No    Sexual activity: Not Currently   Other Topics Concern    Parent/sibling w/ CABG, MI or angioplasty before 65F 55M? Not Asked     Service No    Blood Transfusions No    Caffeine Concern No    Occupational Exposure No    Hobby Hazards No    Sleep Concern No    Stress Concern No    Weight Concern No    Special Diet No    Back Care No    Exercise Yes     Comment: Walking dog    Bike Helmet Not Asked    Seat Belt Yes    Self-Exams Yes   Social History Narrative    Not on file     Social Determinants of Health     Financial Resource Strain: Not on file   Food Insecurity: Not on file   Transportation Needs: Not on file   Physical Activity: Not on file   Stress: Not on file   Social Connections: Not on file   Interpersonal Safety: Low Risk  (4/16/2024)    Interpersonal Safety     Do you feel physically and emotionally safe where you currently live?: Yes     Within the past 12 months, have you been hit, slapped, kicked or otherwise physically hurt by someone?: No     Within the past 12 months, have you been humiliated or emotionally abused in other ways by your partner or ex-partner?: No   Housing Stability: Not on file       FAMILY HISTORY:                   Family History   Problem Relation Age of Onset    Unknown/Adopted Daughter     Hypertension Mother     Eye Disorder Mother         cataracts, glucoma     Diabetes No family hx of     Coronary Artery Disease No family hx of     Colon Cancer No family hx of          Physical exam Reveals:    O/P: WNL  HEENT: WNL  NECK: No JVD, thyromegaly, or lymphadenopathy  HEART: RRR, no murmurs, gallops, or rubs  LUNGS: CTA bilaterally without rales, wheezes, or rhonchi  GI: NABS,  nondistended, nontender, soft  EXT:without cyanosis, clubbing, or edema  NEURO: nonfocal  : no flank tenderness      Rt femoral: 4 plus palpable   Rt popliteal: 3 plus palpable   Rt DP:  3 plus palpable  Rt PT:   3 plus palpable       Lt femoral: 3 plus palpable  Lt popliteal: 3 plus palpable  Lt DP:  3 plus palpable  Lt PT:   3 plus palpable          Exam: Abdominal aortic ultrasound dated 4/29/2024 11:33 AM     Comparison study: 8/13/2021     Clinical history: Ectatic abdominal aorta     Ordering clinician: GHASSAN ANDREWS     Technique: Gray scale (B-mode) assessment with diameter measurements  of the abdominal aorta and common iliac arteries bilaterally.  Additional spectral duplex Doppler ultrasound of the distal abdominal  aorta, with velocity measurement obtained with angle correction at or  less than 60 degrees.     Findings:     On longitudinal and transverse views, respectively, the aortic and  common iliac artery diameters in the anteroposterior plane only are as  follows:     Supraceliac: 3.1 cm, previously 2.6 cm, 3.2 x 3.1 cm  Suprarenal: 2.7 cm, previously 2.5 cm, 2.8 x 2.8 cm  Infrarenal (distal): 2.7 cm, previously 2.4 cm, 2.9 x 5.2 cm (although  this measurement could of been obtained at the level of aortoiliac  bifurcation)     Right KAREN: 1.7 cm, previously 1.4 cm, 1.7 cm  Left KAREN: 1.8 cm, previously 1.3 cm, 1.8 cm                                                                      Impression:      Maximal AP diameter of the infrarenal abdominal aorta is 3.1, which is  aneurysmal. 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 The There has been interval growth in  size of these vessels and further workup with consultation with  vascular surgery should be considered.     Aorta diameter measurement classification:  < 2.5cm: Normal.  2.5 - 2.9cm: Ectatic.  >3cm: Aneurysmal.     DARLENE PLATT MD             EXAMINATION: US ABDOMINAL AORTA LIMITED,  8/13/2021 8:37 AM      COMPARISON: None.     HISTORY: AAA screening     TECHNIQUE: Gray-scale and color Doppler ultrasound of the abdominal  aorta.     FINDINGS: Examination of the abdominal aorta is performed. Examination  is mildly limited due to overlying bowel gas.     Proximal abdominal aorta: 2.6 cm.     Mid abdominal aorta: 2.5 cm.     Distal abdominal aorta: 2.4 cm.     Proximal right common iliac artery is normal in caliber measuring 1.4  cm in maximal diameter.     Proximal left common iliac artery is normal in caliber measuring 1.3  cm in maximal diameter.                                                                      IMPRESSION:  Mildly ectatic aorta without evidence of an abdominal aortic aneurysm.     ------------------------------------------------------------  Aorta diameter measurement classification:  < 2.5cm: Normal.  2.5 - 2.9cm: Ectatic.  >3cm: Aneurysmal.     MAX HANDY MD

## 2024-05-23 NOTE — PROGRESS NOTES
"Patient is here to discuss consult    /77 (BP Location: Right arm, Patient Position: Chair, Cuff Size: Adult Regular)   Pulse 78   Ht 5' 8\" (1.727 m)   Wt 211 lb 3.2 oz (95.8 kg)   SpO2 93%   BMI 32.11 kg/m      Questions patient would like addressed today are: N/A.    Refills are needed: No    Has homecare services and agency name:  [facility] MONTERO    "

## 2024-05-28 ENCOUNTER — TELEPHONE (OUTPATIENT)
Dept: OTHER | Facility: CLINIC | Age: 69
End: 2024-05-28
Payer: MEDICARE

## 2024-05-28 NOTE — TELEPHONE ENCOUNTER
Follow up to 05/23/2024    Fasting labs  BLE Arterial US  Echocardiogram  Virtual appt 2 weeks later    Patient prefers [facility] for testing.

## 2024-05-29 NOTE — TELEPHONE ENCOUNTER
Left voicemail for patient to call back to schedule appointment(s), provided telephone number for patient to call back to schedule.    Left number for [facility] Scheduling to schedule in [place], then call [facility] to schedule follow up with Dr Groves.

## 2024-05-31 ENCOUNTER — LAB (OUTPATIENT)
Dept: LAB | Facility: CLINIC | Age: 69
End: 2024-05-31
Payer: MEDICARE

## 2024-05-31 DIAGNOSIS — E78.5 HYPERLIPIDEMIA LDL GOAL <70: ICD-10-CM

## 2024-05-31 DIAGNOSIS — R79.89 ELEVATED LFTS: ICD-10-CM

## 2024-05-31 LAB
ALBUMIN SERPL BCG-MCNC: 4.5 G/DL (ref 3.5–5.2)
ALP SERPL-CCNC: 57 U/L (ref 40–150)
ALT SERPL W P-5'-P-CCNC: 44 U/L (ref 0–70)
ANION GAP SERPL CALCULATED.3IONS-SCNC: 11 MMOL/L (ref 7–15)
AST SERPL W P-5'-P-CCNC: 43 U/L (ref 0–45)
BILIRUB SERPL-MCNC: 0.7 MG/DL
BUN SERPL-MCNC: 9 MG/DL (ref 8–23)
CALCIUM SERPL-MCNC: 9.5 MG/DL (ref 8.8–10.2)
CHLORIDE SERPL-SCNC: 103 MMOL/L (ref 98–107)
CREAT SERPL-MCNC: 0.9 MG/DL (ref 0.67–1.17)
DEPRECATED HCO3 PLAS-SCNC: 26 MMOL/L (ref 22–29)
EGFRCR SERPLBLD CKD-EPI 2021: >90 ML/MIN/1.73M2
GLUCOSE SERPL-MCNC: 96 MG/DL (ref 70–99)
POTASSIUM SERPL-SCNC: 4.7 MMOL/L (ref 3.4–5.3)
PROT SERPL-MCNC: 7.6 G/DL (ref 6.4–8.3)
SODIUM SERPL-SCNC: 140 MMOL/L (ref 135–145)

## 2024-05-31 PROCEDURE — 83695 ASSAY OF LIPOPROTEIN(A): CPT

## 2024-05-31 PROCEDURE — 36415 COLL VENOUS BLD VENIPUNCTURE: CPT

## 2024-05-31 PROCEDURE — 80061 LIPID PANEL: CPT | Mod: 90

## 2024-05-31 PROCEDURE — 80053 COMPREHEN METABOLIC PANEL: CPT

## 2024-05-31 PROCEDURE — 86141 C-REACTIVE PROTEIN HS: CPT

## 2024-05-31 PROCEDURE — 99000 SPECIMEN HANDLING OFFICE-LAB: CPT

## 2024-05-31 PROCEDURE — 83704 LIPOPROTEIN BLD QUAN PART: CPT | Mod: 90

## 2024-05-31 NOTE — RESULT ENCOUNTER NOTE
Allie Wilkins,    Attached are your test results.  -Liver and gallbladder tests are normal (ALT,AST, Alk phos, bilirubin), kidney function is normal (Cr, GFR), sodium is normal, potassium is normal, calcium is normal, glucose is normal.   Please contact us if you have any questions.    Izabela Moulton, CNP

## 2024-05-31 NOTE — TELEPHONE ENCOUNTER
LM for patient to call us back to schedule:    Follow up to 05/23/2024     Fasting labs - 05/31/24  BLE Arterial US - 06/03/024  Echocardiogram - 06/11/24  Virtual appt 2 weeks later - Still needs to schedule       This is our 2nd and final attempt to schedule this appointment.

## 2024-06-01 LAB
APO A-I SERPL-MCNC: <6 MG/DL
CRP SERPL HS-MCNC: 4.58 MG/L

## 2024-06-03 ENCOUNTER — ANCILLARY PROCEDURE (OUTPATIENT)
Dept: ULTRASOUND IMAGING | Facility: CLINIC | Age: 69
End: 2024-06-03
Attending: INTERNAL MEDICINE
Payer: MEDICARE

## 2024-06-03 DIAGNOSIS — R09.89 ABNORMAL PERIPHERAL PULSE: ICD-10-CM

## 2024-06-03 LAB
CHOLEST SERPL-MCNC: 175 MG/DL
HDL SERPL QN: 8.7 NM
HDL SERPL-SCNC: 34.7 UMOL/L
HDLC SERPL-MCNC: 46 MG/DL
HLD.LARGE SERPL-SCNC: 3.8 UMOL/L
LDL SERPL QN: 21 NM
LDL SERPL-SCNC: 1287 NMOL/L
LDL SMALL SERPL-SCNC: 575 NMOL/L
LDLC SERPL CALC-MCNC: 104 MG/DL
PATHOLOGY STUDY: ABNORMAL
TRIGL SERPL-MCNC: 126 MG/DL
VLDL LARGE SERPL-SCNC: 6.3 NMOL/L
VLDL SERPL QN: 51 NM

## 2024-06-03 PROCEDURE — 93925 LOWER EXTREMITY STUDY: CPT | Performed by: RADIOLOGY

## 2024-06-04 ENCOUNTER — TELEPHONE (OUTPATIENT)
Dept: OTHER | Facility: CLINIC | Age: 69
End: 2024-06-04
Payer: MEDICARE

## 2024-06-04 DIAGNOSIS — E78.5 HYPERLIPIDEMIA LDL GOAL <70: ICD-10-CM

## 2024-06-04 DIAGNOSIS — R09.89 ABNORMAL PERIPHERAL PULSE: ICD-10-CM

## 2024-06-04 DIAGNOSIS — I10 ESSENTIAL HYPERTENSION: ICD-10-CM

## 2024-06-04 DIAGNOSIS — I72.3 ILIAC ANEURYSM (H): ICD-10-CM

## 2024-06-04 DIAGNOSIS — I71.43 INFRARENAL ABDOMINAL AORTIC ANEURYSM (AAA) WITHOUT RUPTURE (H): Primary | ICD-10-CM

## 2024-06-04 NOTE — TELEPHONE ENCOUNTER
.LM for patient to call us back to schedule:    Virtual follow up with Dr. Groves to discuss test results     This is our 3rd attempt to schedule this appointment. (See 05/28/24 encounter)

## 2024-06-04 NOTE — TELEPHONE ENCOUNTER
Please see below and call patient to schedule virtual follow up with Dr. Groves to discuss test results    Cathie MEDRANO, CHRISTIAN    [facility] [place]  Office: [phone]  Fax: [phone]

## 2024-06-04 NOTE — TELEPHONE ENCOUNTER
Closing encounter due to no response from patient to schedule his virtual follow up with Dr Groves.

## 2024-06-04 NOTE — TELEPHONE ENCOUNTER
----- Message from Talha Groves MD sent at 6/4/2024 12:26 PM CDT -----  Please schedule patient to see me in f/u of this result.

## 2024-06-11 ENCOUNTER — ANCILLARY PROCEDURE (OUTPATIENT)
Dept: CARDIOLOGY | Facility: CLINIC | Age: 69
End: 2024-06-11
Attending: INTERNAL MEDICINE
Payer: MEDICARE

## 2024-06-11 DIAGNOSIS — I71.43 INFRARENAL ABDOMINAL AORTIC ANEURYSM (AAA) WITHOUT RUPTURE (H): ICD-10-CM

## 2024-06-11 LAB — LVEF ECHO: NORMAL

## 2024-06-11 PROCEDURE — 93306 TTE W/DOPPLER COMPLETE: CPT | Performed by: INTERNAL MEDICINE

## 2024-06-18 ENCOUNTER — VIRTUAL VISIT (OUTPATIENT)
Dept: OTHER | Facility: CLINIC | Age: 69
End: 2024-06-18
Attending: INTERNAL MEDICINE
Payer: MEDICARE

## 2024-06-18 DIAGNOSIS — I71.43 INFRARENAL ABDOMINAL AORTIC ANEURYSM (AAA) WITHOUT RUPTURE (H): Primary | ICD-10-CM

## 2024-06-18 DIAGNOSIS — E78.5 HYPERLIPIDEMIA LDL GOAL <70: ICD-10-CM

## 2024-06-18 DIAGNOSIS — Z72.0 TOBACCO ABUSE: ICD-10-CM

## 2024-06-18 DIAGNOSIS — I72.3 ILIAC ANEURYSM (H): ICD-10-CM

## 2024-06-18 DIAGNOSIS — R73.01 IFG (IMPAIRED FASTING GLUCOSE): ICD-10-CM

## 2024-06-18 DIAGNOSIS — I10 ESSENTIAL HYPERTENSION: ICD-10-CM

## 2024-06-18 DIAGNOSIS — R09.89 ABNORMAL PERIPHERAL PULSE: ICD-10-CM

## 2024-06-18 PROCEDURE — 99443 PR PHYSICIAN TELEPHONE EVALUATION 21-30 MIN: CPT | Mod: 93 | Performed by: INTERNAL MEDICINE

## 2024-06-18 RX ORDER — ROSUVASTATIN CALCIUM 40 MG/1
40 TABLET, COATED ORAL DAILY
Qty: 90 TABLET | Refills: 3 | Status: SHIPPED | OUTPATIENT
Start: 2024-06-18 | End: 2024-09-25

## 2024-06-18 NOTE — PROGRESS NOTES
Aamir is a 68 year old who is being evaluated via a billable telephone visit.    What phone number would you like to be contacted at? [phone]  How would you like to obtain your AVS? MyChart  Originating Location (pt. Location): Home    Distant Location (provider location):  On-site      Subjective   Aamir is a 68 year old, presenting for the following health issues:  Telephone ([phone]/virtual follow up with Dr. Groves to discuss test results /)      Objective           Vitals:  No vitals were obtained today due to virtual visit.    SOURAV MONTERO

## 2024-06-18 NOTE — PROGRESS NOTES
INITIAL VASCULAR MEDICAL ASSESSMENT  REFERRAL SOURCE: Izabela Moulton APRN CNP   REASON FOR CONSULT: AAA           A/P:     (Z72.0) Tobacco abuse  Comment: I advised he quit and offered him nicotine patches.   Plan: SMOKING CESSATION COUNSELING, 3-10 MIN        He chooses to  utilize patches.               (I71.43) Infrarenal abdominal aortic aneurysm (AAA) without rupture (H24)  (primary encounter diagnosis)  Comment: Small but growing. Does not need surgical intervention at present. Eval. For other sites of aneurysmal ds. No ATAA on TTE.   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 Does not need surgical intervention at present. Eval. For other sites of aneurysmal ds revealed BLE fem pop ectasia.   Plan: US Lower Extremity Arterial Duplex Bilateral surveillance in 06/2025.I advised complete tobacco cessation. See below.      (R09.89) Abnormal peripheral pulse  Comment: No claudication.  Plan: US Lower Extremity Arterial Duplex Bilateral in 06/2025             (E78.5) Hyperlipidemia LDL goal <70  Comment: Advanced lipid testing of 5/31/2024 revealed LDL-C of 104, LDL-P of 1287, cardiac CRP of 4.58, and Lp(a) of <6.   Plan:Start Crestor 40, check  C-Reactive Protein, High Sensitivity,         Comprehensive metabolic panel, LipoFit by NMR,         Lipoprotein (a) in 09/2024, RTC two weeks alter.              (I10) Essential hypertension  Comment: BP goal < 120/70 secondary to multiple areas of aneurysmal ds.   Plan: Check BP at home, bring home log book in when seen next.         The longitudinal care of lisa vieira Aamir was addressed during this visit. Due to added complexity of care, we will continue to support Aamir Wilkins  and the subsequent management of these conditions and with ongoing continuity of care for these conditions.        25 minutes total medical care on today's date.    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 None of these require surgical intervention at present. He is currently smoking 1 ppd for the last 30 years.         Review Of Systems  Skin: negative  Eyes: negative  Ears/Nose/Throat: negative  Respiratory: No shortness of breath, dyspnea on exertion, cough, or hemoptysis  Cardiovascular: negative  Gastrointestinal: negative  Genitourinary: negative  Musculoskeletal: negative  Neurologic: negative  Psychiatric: negative  Hematologic/Lymphatic/Immunologic: negative  Endocrine: negative        PAST MEDICAL HISTORY:                  Past Medical History        Past Medical History:   Diagnosis Date    Hypertension      NO ACTIVE PROBLEMS              PAST SURGICAL HISTORY:                  Past Surgical History         Past Surgical History:   Procedure Laterality Date    COLONOSCOPY N/A 4/28/2023     Procedure: COLONOSCOPY, FLEXIBLE, WITH LESION REMOVAL USING SNARE;  Surgeon: Blair Stack MD;  Location: [facility]    COLONOSCOPY WITH CO2 INSUFFLATION N/A 7/30/2021     Procedure: COLONOSCOPY, WITH CO2 INSUFFLATION;  Surgeon: Albania Puentes DO;  Location: [facility]    COLONOSCOPY WITH CO2 INSUFFLATION N/A 4/28/2023     Procedure: Colonoscopy with CO2 insufflation;  Surgeon: Blair Stack MD;  Location: [facility]    HERNIA REPAIR   1974     age 17--right    LAPAROTOMY EXPLORATORY   1998     RLQ abcess    ROTATOR CUFF REPAIR RT/LT   2002     left             CURRENT MEDICATIONS:                  Current Outpatient Prescriptions          Current Outpatient Medications   Medication Sig Dispense Refill    amLODIPine (NORVASC) 10 MG tablet Take 1 tablet (10 mg) by mouth daily 90 tablet 3    IBUPROFEN PO Take by mouth as needed for moderate pain        lisinopril (ZESTRIL) 40 MG tablet Take 1 tablet (40 mg) by mouth daily 90 tablet 3             ALLERGIES:                  Allergies   No Known Allergies        SOCIAL HISTORY:                  Social History   Social History            Socioeconomic History    Marital status:        Spouse name: Not on file    Number of children: Not on file    Years of education: Not on file    Highest education level: Not on file   Occupational History    Occupation:  [employer]       Employer: [employer]   Tobacco Use    Smoking status: Every Day       Current packs/day: 1.00       Average packs/day: 1 pack/day for 25.0 years (25.0 ttl pk-yrs)       Types: Cigarettes    Smokeless tobacco: Never   Vaping Use    Vaping status: Never Used   Substance and Sexual Activity    Alcohol use: Yes       Comment: 2-3 beers/day     Drug use: No    Sexual activity: Not Currently   Other Topics Concern    Parent/sibling w/ CABG, MI or angioplasty before 65F 55M? Not Asked     Service No    Blood Transfusions No    Caffeine Concern No    Occupational Exposure No    Hobby Hazards No    Sleep Concern No    Stress Concern No    Weight Concern No    Special Diet No    Back Care No    Exercise Yes       Comment: Walking dog    Bike Helmet Not Asked    Seat Belt Yes    Self-Exams Yes   Social History Narrative    Not on file      Social Determinants of Health           Financial Resource Strain: Not on file   Food Insecurity: Not on file   Transportation Needs: Not on file   Physical Activity: Not on file   Stress: Not on file   Social Connections: Not on file   Interpersonal Safety: Low Risk  (4/16/2024)     Interpersonal Safety      Do you feel physically and emotionally safe where you currently live?: Yes      Within the past 12 months, have you been hit, slapped, kicked or otherwise physically hurt by someone?: No      Within the past 12 months, have you been humiliated or emotionally abused in other ways by your partner or ex-partner?: No   Housing Stability: Not on file            FAMILY HISTORY:                    Family History         Family History   Problem Relation Age of Onset    Unknown/Adopted Daughter      Hypertension Mother      Eye Disorder Mother           cataracts, glucoma     Diabetes No family hx of      Coronary Artery Disease No family hx of      Colon Cancer No family hx of                 Physical exam was not undertaken as this was a billable telephone encounter.            Exam: Abdominal aortic ultrasound dated 4/29/2024 11:33 AM     Comparison study: 8/13/2021     Clinical history: Ectatic abdominal aorta     Ordering clinician: GHASSAN ANDREWS     Technique: Gray scale (B-mode) assessment with diameter measurements  of the abdominal aorta and common iliac arteries bilaterally.  Additional spectral duplex Doppler ultrasound of the distal abdominal  aorta, with velocity measurement obtained with angle correction at or  less than 60 degrees.     Findings:     On longitudinal and transverse views, respectively, the aortic and  common iliac artery diameters in the anteroposterior plane only are as  follows:     Supraceliac: 3.1 cm, previously 2.6 cm, 3.2 x 3.1 cm  Suprarenal: 2.7 cm, previously 2.5 cm, 2.8 x 2.8 cm  Infrarenal (distal): 2.7 cm, previously 2.4 cm, 2.9 x 5.2 cm (although  this measurement could of been obtained at the level of aortoiliac  bifurcation)     Right KAREN: 1.7 cm, previously 1.4 cm, 1.7 cm  Left KAREN: 1.8 cm, previously 1.3 cm, 1.8 cm                                                                      Impression:      Maximal AP diameter of the infrarenal abdominal aorta is 3.1, which is  aneurysmal. 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 The There has been interval growth in  size of these vessels and further workup with consultation with  vascular surgery should be considered.     Aorta diameter measurement classification:  < 2.5cm: Normal.  2.5 - 2.9cm: Ectatic.  >3cm: Aneurysmal.     DARLENE PLATT MD                EXAMINATION: US ABDOMINAL AORTA LIMITED, 2021 8:37 AM      COMPARISON: None.     HISTORY: AAA screening     TECHNIQUE: Gray-scale and color Doppler ultrasound of the abdominal  aorta.     FINDINGS: Examination of the abdominal aorta is performed. Examination  is mildly limited due to overlying bowel gas.     Proximal abdominal aorta: 2.6 cm.     Mid abdominal aorta: 2.5 cm.     Distal abdominal aorta: 2.4 cm.     Proximal right common iliac artery is normal in caliber measuring 1.4  cm in maximal diameter.     Proximal left common iliac artery is normal in caliber measuring 1.3  cm in maximal diameter.                                                                      IMPRESSION:  Mildly ectatic aorta without evidence of an abdominal aortic aneurysm.     ------------------------------------------------------------  Aorta diameter measurement classification:  < 2.5cm: Normal.  2.5 - 2.9cm: Ectatic.  >3cm: Aneurysmal.     MAX HANDY MD           [facility], Echocardiography Laboratory  [address] [place] [address]     Name: BELKYS PATRICK  MRN: [id number]  : 1955  Study Date: 2024 11:07 AM  Age: 68 yrs  Gender: Male  Patient Location: [facility]  Reason For Study: Infrarenal abdominal aortic aneurysm (AAA) without rupture  (H24)  Ordering Physician: SHERON CADENA  Referring Physician: SHERON CADENA  Performed By: Cheryl Tesfaye RDCS     BSA: 2.1 m2  Height: 68 in  Weight: 211 lb  BP: 126/79 mmHg  ______________________________________________________________________________  Procedure  Echocardiogram with two-dimensional, color and spectral Doppler performed.  ______________________________________________________________________________  Interpretation Summary     Left ventricular size, wall motion and function are normal. The ejection  fraction is 55-60%.     Global right ventricular function is normal. The right ventricle is  normal  size.     No pericardial effusion is present.     The inferior vena cava is normal.     There is no prior study for direct comparison.     ______________________________________________________________________________  Left Ventricle  Left ventricular size, wall motion and function are normal. The ejection  fraction is 55-60%.     Right Ventricle  Global right ventricular function is normal. The right ventricle is normal  size.     Atria  Both atria appear normal. An atrial-septal aneurysm is present.     Mitral Valve  The mitral valve is normal.     Aortic Valve  Trileaflet aortic sclerosis without stenosis.     Tricuspid Valve  The tricuspid valve is normal. The peak velocity of the tricuspid regurgitant  jet is not obtainable. Pulmonary artery systolic pressure cannot be assessed.  Trace tricuspid insufficiency is present.     Pulmonic Valve  The valve leaflets are not well visualized. On Doppler interrogation, there is  no significant stenosis or regurgitation.     Vessels  The inferior vena cava is normal. Sinuses of Valsalva 3.6 cm. IVC diameter  <2.1 cm collapsing >50% with sniff suggests a normal RA pressure of 3 mmHg.     Pericardium  No pericardial effusion is present.     Compared to Previous Study  There is no prior study for direct comparison.  ______________________________________________________________________________  MMode/2D Measurements & Calculations     Ao root diam: 3.6 cm  asc Aorta Diam: 3.4 cm  LVOT diam: 2.2 cm  LVOT area: 3.7 cm2  Ao root diam index Ht(cm/m): 2.1  Ao root diam index BSA (cm/m2): 1.7  Asc Ao diam index BSA (cm/m2): 1.6  Asc Ao diam index Ht(cm/m): 2.0  LA Volume (BP): 36.6 ml  LA Volume Index (BP): 17.5 ml/m2  TAPSE: 2.1 cm     Doppler Measurements & Calculations  MV E max sherri: 63.7 cm/sec  MV A max sherri: 73.8 cm/sec  MV E/A: 0.86  MV dec slope: 251.1 cm/sec2  MV dec time: 0.25 sec  E/E' av.4  Lateral E/e': 6.8  Medial E/e': 9.9      ______________________________________________________________________________  Report approved by: Svitlana Jauregui 06/11/2024 12:21 PM         ULTRASOUND LOWER EXTREMITY ARTERIAL DUPLEX BILATERAL 6/3/2024 5:21 PM     CLINICAL HISTORY: known aortoiliac aneurysmal ds and abnormal LE  pulses, evel for fem pop aneurysmal ds.; Abnormal peripheral pulse.      COMPARISONS: Ultrasound abdominal aorta 4/29/2024.     REFERRING PROVIDER: SHERON CADENA     TECHNIQUE: Bilateral leg arteries evaluated with grayscale, color  Doppler, and spectral pulsed wave Doppler ultrasound.     FINDINGS:   RIGHT:  External iliac artery: 78/0 cm/s, triphasic  Common femoral artery: 55/0 cm/s, triphasic, 11.8 mm  Profundus femoral artery: 56/0 cm/s, triphasic     Superficial femoral artery, proximal: 64/0 cm/s, triphasic, 8.6 mm  Superficial femoral artery, mid: 83/0 cm/s, triphasic, 8.4 mm  Superficial femoral artery, distal: 61/0 cm/s, triphasic, 8.8 mm     Popliteal artery, proximal: 46/0 cm/s, triphasic, 8.1 mm  Popliteal artery, distal: 55/0 cm/s, triphasic, 8.4 mm     Posterior tibial artery: 68/7 cm/s, triphasic  Anterior tibial artery: 77/12 cm/s, triphasic     LEFT:  External iliac artery: 52/0 cm/s, triphasic, echogenic plaque causes  less than 50% diameter stenosis.  Common femoral artery: 57/0 cm/s, triphasic, 13.2 mm, echogenic plaque  causes less than 50% diameter stenosis.  Profundus femoral artery: 47/0 cm/s, triphasic     Superficial femoral artery, proximal: 63/0 cm/s, triphasic, 9.6 mm  Superficial femoral artery, mid: 72/0 cm/s, triphasic, 8.6 mm  Superficial femoral artery, distal: 55/0 cm/s, triphasic, 8.9 mm     Popliteal artery, proximal: 49/0 cm/s, triphasic, 10.1 mm  Popliteal artery, distal: 59/0 cm/s, triphasic, 7.5 mm     Posterior tibial artery: 75/13 cm/s, triphasic  Anterior tibial artery: 63/0 cm/s, triphasic                                                                      IMPRESSION:  1.  RIGHT:  A. Common femoral artery measures 11.8 mm in diameter.  B. Superficial femoral artery measures up to 8.8 mm in diameter.  C. Popliteal artery up to measures 8.4 mm in diameter.  D. No stenosis.     2. LEFT:  A. Common femoral artery measures 13.2 mm in diameter.  B. Echogenic plaque causes less than 50% diameter stenosis in external  iliac and common femoral arteries.  C. Superficial femoral artery measures up to 9.6 mm in diameter.   D. Popliteal artery measures up to 10.1 mm in diameter.     BHUPENDRA HASSAN MD            Component      Latest Ref Rng 5/31/2024  10:45 AM   Sodium      135 - 145 mmol/L 140    Potassium      3.4 - 5.3 mmol/L 4.7    Carbon Dioxide (CO2)      22 - 29 mmol/L 26    Anion Gap      7 - 15 mmol/L 11    Urea Nitrogen      8.0 - 23.0 mg/dL 9.0    Creatinine      0.67 - 1.17 mg/dL 0.90    GFR Estimate      >60 mL/min/1.73m2 >90    Calcium      8.8 - 10.2 mg/dL 9.5    Chloride      98 - 107 mmol/L 103    Glucose      70 - 99 mg/dL 96    Alkaline Phosphatase      40 - 150 U/L 57    AST      0 - 45 U/L 43    ALT      0 - 70 U/L 44    Protein Total      6.4 - 8.3 g/dL 7.6    Albumin      3.5 - 5.2 g/dL 4.5    Bilirubin Total      <=1.2 mg/dL 0.7    Total Cholesterol      <=199 mg/dL 175    Triglycerides      30 - 149 mg/dL 126    HDL Cholesterol      40 - 59 mg/dL 46    LDL Cholesterol      <=129 mg/dL 104    HDL Size      >=8.9 nm 8.7 (L)    VLDL Size      <=46.7 nm 51.0 (H)    LDL Particle Size      >=20.7 nm 21.0    Lge HDL Particle number      >=4.2 umol/L 3.8 (L)    HDL Particle Number NMR      >=33.0 umol/L 34.7    Lge VLDL Part number      <=2.7 nmol/L 6.3 (H)    Small LDL Particle number      <=634 nmol/L 575    LDL Particle Number      <=1135 nmol/L 1287 (H)    EER LipoFit by NMR See Note    C-Reactive Protein High Sensitivity 4.58    Lipoprotein (a)      <30 mg/dL <6       Legend:  (L) Low  (H) High

## 2024-08-30 ENCOUNTER — TELEPHONE (OUTPATIENT)
Dept: OTHER | Facility: CLINIC | Age: 69
End: 2024-08-30
Payer: MEDICARE

## 2024-09-10 ENCOUNTER — LAB (OUTPATIENT)
Dept: LAB | Facility: CLINIC | Age: 69
End: 2024-09-10
Payer: MEDICARE

## 2024-09-10 DIAGNOSIS — E78.5 HYPERLIPIDEMIA LDL GOAL <70: ICD-10-CM

## 2024-09-10 DIAGNOSIS — R73.01 IFG (IMPAIRED FASTING GLUCOSE): ICD-10-CM

## 2024-09-10 LAB
ALBUMIN SERPL BCG-MCNC: 4.5 G/DL (ref 3.5–5.2)
ALP SERPL-CCNC: 57 U/L (ref 40–150)
ALT SERPL W P-5'-P-CCNC: 42 U/L (ref 0–70)
ANION GAP SERPL CALCULATED.3IONS-SCNC: 11 MMOL/L (ref 7–15)
APO A-I SERPL-MCNC: <6 MG/DL
AST SERPL W P-5'-P-CCNC: 40 U/L (ref 0–45)
BILIRUB SERPL-MCNC: 0.7 MG/DL
BUN SERPL-MCNC: 9.7 MG/DL (ref 8–23)
CALCIUM SERPL-MCNC: 9.5 MG/DL (ref 8.8–10.4)
CHLORIDE SERPL-SCNC: 102 MMOL/L (ref 98–107)
CREAT SERPL-MCNC: 0.89 MG/DL (ref 0.67–1.17)
CRP SERPL HS-MCNC: 7.34 MG/L
EGFRCR SERPLBLD CKD-EPI 2021: >90 ML/MIN/1.73M2
GLUCOSE SERPL-MCNC: 97 MG/DL (ref 70–99)
HBA1C MFR BLD: 5.8 % (ref 0–5.6)
HCO3 SERPL-SCNC: 26 MMOL/L (ref 22–29)
POTASSIUM SERPL-SCNC: 4.2 MMOL/L (ref 3.4–5.3)
PROT SERPL-MCNC: 7.6 G/DL (ref 6.4–8.3)
SODIUM SERPL-SCNC: 139 MMOL/L (ref 135–145)

## 2024-09-10 PROCEDURE — 83036 HEMOGLOBIN GLYCOSYLATED A1C: CPT

## 2024-09-10 PROCEDURE — 80061 LIPID PANEL: CPT | Mod: 90

## 2024-09-10 PROCEDURE — 99000 SPECIMEN HANDLING OFFICE-LAB: CPT

## 2024-09-10 PROCEDURE — 86141 C-REACTIVE PROTEIN HS: CPT

## 2024-09-10 PROCEDURE — 80053 COMPREHEN METABOLIC PANEL: CPT

## 2024-09-10 PROCEDURE — 83695 ASSAY OF LIPOPROTEIN(A): CPT

## 2024-09-10 PROCEDURE — 36415 COLL VENOUS BLD VENIPUNCTURE: CPT

## 2024-09-14 LAB
CHOLEST SERPL-MCNC: 106 MG/DL
HDL SERPL QN: 9 NM
HDL SERPL-SCNC: 32.8 UMOL/L
HDLC SERPL-MCNC: 44 MG/DL
HLD.LARGE SERPL-SCNC: 5.3 UMOL/L
LDL SERPL QN: 20.8 NM
LDL SERPL-SCNC: 596 NMOL/L
LDL SMALL SERPL-SCNC: 379 NMOL/L
LDLC SERPL CALC-MCNC: 42 MG/DL
PATHOLOGY STUDY: ABNORMAL
TRIGL SERPL-MCNC: 102 MG/DL
VLDL LARGE SERPL-SCNC: 3.9 NMOL/L
VLDL SERPL QN: 50.6 NM

## 2024-09-25 ENCOUNTER — VIRTUAL VISIT (OUTPATIENT)
Dept: OTHER | Facility: CLINIC | Age: 69
End: 2024-09-25
Attending: INTERNAL MEDICINE
Payer: MEDICARE

## 2024-09-25 DIAGNOSIS — R73.01 IFG (IMPAIRED FASTING GLUCOSE): ICD-10-CM

## 2024-09-25 DIAGNOSIS — I72.3 ILIAC ANEURYSM (H): ICD-10-CM

## 2024-09-25 DIAGNOSIS — I10 ESSENTIAL HYPERTENSION: ICD-10-CM

## 2024-09-25 DIAGNOSIS — Z72.0 TOBACCO ABUSE: ICD-10-CM

## 2024-09-25 DIAGNOSIS — I10 HTN, GOAL BELOW 140/90: ICD-10-CM

## 2024-09-25 DIAGNOSIS — R09.89 ABNORMAL PERIPHERAL PULSE: ICD-10-CM

## 2024-09-25 DIAGNOSIS — E78.5 HYPERLIPIDEMIA LDL GOAL <70: ICD-10-CM

## 2024-09-25 DIAGNOSIS — I71.43 INFRARENAL ABDOMINAL AORTIC ANEURYSM (AAA) WITHOUT RUPTURE (H): ICD-10-CM

## 2024-09-25 PROCEDURE — 99443 PR PHYSICIAN TELEPHONE EVALUATION 21-30 MIN: CPT | Mod: 93 | Performed by: INTERNAL MEDICINE

## 2024-09-25 RX ORDER — AMLODIPINE BESYLATE 10 MG/1
10 TABLET ORAL DAILY
Qty: 90 TABLET | Refills: 3 | Status: SHIPPED | OUTPATIENT
Start: 2024-09-25

## 2024-09-25 RX ORDER — LISINOPRIL 40 MG/1
40 TABLET ORAL DAILY
Qty: 90 TABLET | Refills: 3 | Status: SHIPPED | OUTPATIENT
Start: 2024-09-25

## 2024-09-25 RX ORDER — ROSUVASTATIN CALCIUM 40 MG/1
40 TABLET, COATED ORAL DAILY
Qty: 90 TABLET | Refills: 3 | Status: SHIPPED | OUTPATIENT
Start: 2024-09-25

## 2024-09-25 NOTE — PROGRESS NOTES
VASCULAR MEDICAL ASSESSMENT  REFERRAL SOURCE: Izabela Moulton APRN CNP   REASON FOR CONSULT: AAA           A/P:     (Z72.0) Tobacco abuse  Comment: I advised he quit and offered him nicotine patches.   Plan: SMOKING CESSATION COUNSELING, 3-10 MIN        He chooses to  utilize patches.               (I71.43) Infrarenal abdominal aortic aneurysm (AAA) without rupture (H24)  (primary encounter diagnosis)  Comment: Small but growing. Does not need surgical intervention at present. Eval. For other sites of aneurysmal ds. No ATAA on TTE.   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 Does not need surgical intervention at present. Eval. For other sites of aneurysmal ds revealed BLE fem pop ectasia.   Plan: US Lower Extremity Arterial Duplex Bilateral surveillance in 06/2025.I advised complete tobacco cessation. See below.      (R09.89) Abnormal peripheral pulse  Comment: No claudication.  Plan: US Lower Extremity Arterial Duplex Bilateral in 06/2025             (E78.5) Hyperlipidemia LDL goal <70  Comment: Advanced lipid testing of 5/31/2024 revealed LDL-C of 104, LDL-P of 1287, cardiac CRP of 4.58, and Lp(a) of <6. We started Crestor 40 mg daily in 06/2024. Advanced lipid testing of 09/10/2024 revealed LDL-C of 42, LDL-P of 596, cardiac CRP of 7.34, and Lp(a) of <6.     Plan: Continue Crestor 40, check  C-Reactive Protein, High Sensitivity,         Comprehensive metabolic panel, LipoFit by NMR,         Lipoprotein (a) in 06/2025, RTC two weeks alter.              (I10) Essential hypertension  Comment: BP goal < 120/70 secondary to multiple areas of aneurysmal ds.   Plan: He was told to check BP at home, but did not. We will simply check BP in 06/2025        The longitudinal care of lisa for Aamir was addressed during this visit. Due to added complexity of care, we will continue to support Aamir JANES Wilkins  and the subsequent management of these conditions and with ongoing  continuity of care for these conditions.         21 minutes total medical care on today's date.     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 None of these require surgical intervention at present. He is currently smoking 1 ppd for the last 30 years.         Review Of Systems  Skin: negative  Eyes: negative  Ears/Nose/Throat: negative  Respiratory: No shortness of breath, dyspnea on exertion, cough, or hemoptysis  Cardiovascular: negative  Gastrointestinal: negative  Genitourinary: negative  Musculoskeletal: negative  Neurologic: negative  Psychiatric: negative  Hematologic/Lymphatic/Immunologic: negative  Endocrine: negative        PAST MEDICAL HISTORY:                  Past Medical History           Past Medical History:   Diagnosis Date    Hypertension      NO ACTIVE PROBLEMS              PAST SURGICAL HISTORY:                  Past Surgical History             Past Surgical History:   Procedure Laterality Date    COLONOSCOPY N/A 4/28/2023     Procedure: COLONOSCOPY, FLEXIBLE, WITH LESION REMOVAL USING SNARE;  Surgeon: Blair Stack MD;  Location: [facility]    COLONOSCOPY WITH CO2 INSUFFLATION N/A 7/30/2021     Procedure: COLONOSCOPY, WITH CO2 INSUFFLATION;  Surgeon: Albania Puentes DO;  Location: [facility]    COLONOSCOPY WITH CO2 INSUFFLATION N/A 4/28/2023     Procedure: Colonoscopy with CO2 insufflation;  Surgeon: Blair Stack MD;  Location: [facility]    HERNIA REPAIR   1974     age 17--right    LAPAROTOMY EXPLORATORY   1998     RLQ abcess    ROTATOR CUFF REPAIR RT/LT   2002     left             CURRENT MEDICATIONS:                  Current Outpatient Prescriptions               Current Outpatient Medications   Medication Sig Dispense Refill    amLODIPine (NORVASC) 10 MG tablet Take 1  tablet (10 mg) by mouth daily 90 tablet 3    IBUPROFEN PO Take by mouth as needed for moderate pain        lisinopril (ZESTRIL) 40 MG tablet Take 1 tablet (40 mg) by mouth daily 90 tablet 3            ALLERGIES:                  Allergies   No Known Allergies         SOCIAL HISTORY:                  Social History   Social History                Socioeconomic History    Marital status:        Spouse name: Not on file    Number of children: Not on file    Years of education: Not on file    Highest education level: Not on file   Occupational History    Occupation:  le[employer]       Employer: [employer]   Tobacco Use    Smoking status: Every Day       Current packs/day: 1.00       Average packs/day: 1 pack/day for 25.0 years (25.0 ttl pk-yrs)       Types: Cigarettes    Smokeless tobacco: Never   Vaping Use    Vaping status: Never Used   Substance and Sexual Activity    Alcohol use: Yes       Comment: 2-3 beers/day     Drug use: No    Sexual activity: Not Currently   Other Topics Concern    Parent/sibling w/ CABG, MI or angioplasty before 65F 55M? Not Asked     Service No    Blood Transfusions No    Caffeine Concern No    Occupational Exposure No    Hobby Hazards No    Sleep Concern No    Stress Concern No    Weight Concern No    Special Diet No    Back Care No    Exercise Yes       Comment: Walking dog    Bike Helmet Not Asked    Seat Belt Yes    Self-Exams Yes   Social History Narrative    Not on file      Social Determinants of Health              Financial Resource Strain: Not on file   Food Insecurity: Not on file   Transportation Needs: Not on file   Physical Activity: Not on file   Stress: Not on file   Social Connections: Not on file   Interpersonal Safety: Low Risk  (4/16/2024)     Interpersonal Safety      Do you feel physically and emotionally safe where you currently live?: Yes      Within the past 12 months, have you been hit, slapped, kicked or otherwise physically hurt by  someone?: No      Within the past 12 months, have you been humiliated or emotionally abused in other ways by your partner or ex-partner?: No   Housing Stability: Not on file            FAMILY HISTORY:                   Family History             Family History   Problem Relation Age of Onset    Unknown/Adopted Daughter      Hypertension Mother      Eye Disorder Mother           cataracts, glucoma     Diabetes No family hx of      Coronary Artery Disease No family hx of      Colon Cancer No family hx of                 Physical exam was not undertaken as this was a billable telephone encounter.                        All relevant labs and imaging reviewed by myself on today's date.

## 2024-09-25 NOTE — PROGRESS NOTES
Aamir is a 68 year old who is being evaluated via a billable telephone visit.    What phone number would you like to be contacted at? Telephone: [phone]     How would you like to obtain your AVS? Toya Zepeda MA

## 2025-03-08 ENCOUNTER — HEALTH MAINTENANCE LETTER (OUTPATIENT)
Age: 70
End: 2025-03-08

## 2025-06-30 ENCOUNTER — OFFICE VISIT (OUTPATIENT)
Dept: FAMILY MEDICINE | Facility: CLINIC | Age: 70
End: 2025-06-30
Payer: MEDICARE

## 2025-06-30 VITALS
BODY MASS INDEX: 31.72 KG/M2 | OXYGEN SATURATION: 94 % | DIASTOLIC BLOOD PRESSURE: 68 MMHG | TEMPERATURE: 97.4 F | SYSTOLIC BLOOD PRESSURE: 115 MMHG | RESPIRATION RATE: 18 BRPM | WEIGHT: 209.3 LBS | HEIGHT: 68 IN | HEART RATE: 71 BPM

## 2025-06-30 DIAGNOSIS — I10 HTN, GOAL BELOW 140/90: ICD-10-CM

## 2025-06-30 DIAGNOSIS — L91.8 SKIN TAG: ICD-10-CM

## 2025-06-30 DIAGNOSIS — Z00.00 ENCOUNTER FOR MEDICARE ANNUAL WELLNESS EXAM: Primary | ICD-10-CM

## 2025-06-30 DIAGNOSIS — Z87.891 HISTORY OF TOBACCO USE, PRESENTING HAZARDS TO HEALTH: ICD-10-CM

## 2025-06-30 DIAGNOSIS — Z12.5 SCREENING FOR PROSTATE CANCER: ICD-10-CM

## 2025-06-30 DIAGNOSIS — Z87.891 PERSONAL HISTORY OF NICOTINE DEPENDENCE: ICD-10-CM

## 2025-06-30 DIAGNOSIS — Z13.29 SCREENING FOR THYROID DISORDER: ICD-10-CM

## 2025-06-30 DIAGNOSIS — Z12.2 ENCOUNTER FOR SCREENING FOR LUNG CANCER: ICD-10-CM

## 2025-06-30 DIAGNOSIS — E78.5 HYPERLIPIDEMIA LDL GOAL <130: ICD-10-CM

## 2025-06-30 DIAGNOSIS — Z13.0 SCREENING FOR DISORDER OF BLOOD AND BLOOD-FORMING ORGANS: ICD-10-CM

## 2025-06-30 DIAGNOSIS — Z13.1 SCREENING FOR DIABETES MELLITUS (DM): ICD-10-CM

## 2025-06-30 PROCEDURE — 90480 ADMN SARSCOV2 VAC 1/ONLY CMP: CPT | Performed by: NURSE PRACTITIONER

## 2025-06-30 PROCEDURE — 3074F SYST BP LT 130 MM HG: CPT | Performed by: NURSE PRACTITIONER

## 2025-06-30 PROCEDURE — 91320 SARSCV2 VAC 30MCG TRS-SUC IM: CPT | Performed by: NURSE PRACTITIONER

## 2025-06-30 PROCEDURE — 1126F AMNT PAIN NOTED NONE PRSNT: CPT | Performed by: NURSE PRACTITIONER

## 2025-06-30 PROCEDURE — 99213 OFFICE O/P EST LOW 20 MIN: CPT | Mod: 25 | Performed by: NURSE PRACTITIONER

## 2025-06-30 PROCEDURE — 3078F DIAST BP <80 MM HG: CPT | Performed by: NURSE PRACTITIONER

## 2025-06-30 PROCEDURE — G0439 PPPS, SUBSEQ VISIT: HCPCS | Performed by: NURSE PRACTITIONER

## 2025-06-30 SDOH — HEALTH STABILITY: PHYSICAL HEALTH: ON AVERAGE, HOW MANY DAYS PER WEEK DO YOU ENGAGE IN MODERATE TO STRENUOUS EXERCISE (LIKE A BRISK WALK)?: 1 DAY

## 2025-06-30 SDOH — HEALTH STABILITY: PHYSICAL HEALTH: ON AVERAGE, HOW MANY MINUTES DO YOU ENGAGE IN EXERCISE AT THIS LEVEL?: 20 MIN

## 2025-06-30 ASSESSMENT — SOCIAL DETERMINANTS OF HEALTH (SDOH): HOW OFTEN DO YOU GET TOGETHER WITH FRIENDS OR RELATIVES?: ONCE A WEEK

## 2025-06-30 ASSESSMENT — PAIN SCALES - GENERAL: PAINLEVEL_OUTOF10: NO PAIN (0)

## 2025-06-30 NOTE — PATIENT INSTRUCTIONS
PLAN:   1.   Symptomatic therapy suggested: Continue current medications as prescribed.  .  2.  Orders Placed This Encounter   Procedures    REVIEW OF HEALTH MAINTENANCE PROTOCOL ORDERS    CT Chest Lung Cancer Screen Low Dose Without    COVID-19 12+ (PFIZER)    Lipid panel reflex to direct LDL Fasting    CBC with platelets    Comprehensive metabolic panel    TSH with free T4 reflex    Hemoglobin A1c    Prostate Specific Antigen Screen    Adult Eye  Referral     3.  FURTHER TESTING:       - CT chest   CONSULTATION/REFERRAL to ophthalmology   Appointment for fasting labs   Will follow up and/or notify patient of  results via My Chart to determine further need for followup   Follow up office visit in one year for annual health maintenance exam, sooner PRN.   Patient needs to follow up in if no improvement,or sooner if worsening of symptoms or other symptoms develop.            Patient Education   Preventive Care Advice   This is general advice given by our system to help you stay healthy. However, your care team may have specific advice just for you. Please talk to your care team about your preventive care needs.  Nutrition  Eat 5 or more servings of fruits and vegetables each day.  Try wheat bread, brown rice and whole grain pasta (instead of white bread, rice, and pasta).  Get enough calcium and vitamin D. Check the label on foods and aim for 100% of the RDA (recommended daily allowance).  Lifestyle  Exercise at least 150 minutes each week  (30 minutes a day, 5 days a week).  Do muscle strengthening activities 2 days a week. These help control your weight and prevent disease.  No smoking.  Wear sunscreen to prevent skin cancer.  Have a dental exam and cleaning every 6 months.  Yearly exams  See your health care team every year to talk about:  Any changes in your health.  Any medicines your care team has prescribed.  Preventive care, family planning, and ways to prevent chronic diseases.  Shots (vaccines)    HPV shots (up to age 26), if you've never had them before.  Hepatitis B shots (up to age 59), if you've never had them before.  COVID-19 shot: Get this shot when it's due.  Flu shot: Get a flu shot every year.  Tetanus shot: Get a tetanus shot every 10 years.  Pneumococcal, hepatitis A, and RSV shots: Ask your care team if you need these based on your risk.  Shingles shot (for age 50 and up)  General health tests  Diabetes screening:  Starting at age 35, Get screened for diabetes at least every 3 years.  If you are younger than age 35, ask your care team if you should be screened for diabetes.  Cholesterol test: At age 39, start having a cholesterol test every 5 years, or more often if advised.  Bone density scan (DEXA): At age 50, ask your care team if you should have this scan for osteoporosis (brittle bones).  Hepatitis C: Get tested at least once in your life.  STIs (sexually transmitted infections)  Before age 24: Ask your care team if you should be screened for STIs.  After age 24: Get screened for STIs if you're at risk. You are at risk for STIs (including HIV) if:  You are sexually active with more than one person.  You don't use condoms every time.  You or a partner was diagnosed with a sexually transmitted infection.  If you are at risk for HIV, ask about PrEP medicine to prevent HIV.  Get tested for HIV at least once in your life, whether you are at risk for HIV or not.  Cancer screening tests  Cervical cancer screening: If you have a cervix, begin getting regular cervical cancer screening tests starting at age 21.  Breast cancer scan (mammogram): If you've ever had breasts, begin having regular mammograms starting at age 40. This is a scan to check for breast cancer.  Colon cancer screening: It is important to start screening for colon cancer at age 45.  Have a colonoscopy test every 10 years (or more often if you're at risk) Or, ask your provider about stool tests like a FIT test every year or  Cologuard test every 3 years.  To learn more about your testing options, visit:   .  For help making a decision, visit:   https://bit.ly/uf59814.  Prostate cancer screening test: If you have a prostate, ask your care team if a prostate cancer screening test (PSA) at age 55 is right for you.  Lung cancer screening: If you are a current or former smoker ages 50 to 80, ask your care team if ongoing lung cancer screenings are right for you.  For informational purposes only. Not to replace the advice of your health care provider. Copyright   2023 [employer]. All rights reserved. Clinically reviewed by the [facility] Transitions Program. [address] [id number] - REV 01/24.

## 2025-06-30 NOTE — PROGRESS NOTES
Preventive Care Visit  [facility]  Izabela HAVEN Lee CNP, Family Medicine  Jun 30, 2025      Assessment & Plan     Encounter for Medicare annual wellness exam  - REVIEW OF HEALTH MAINTENANCE PROTOCOL ORDERS    History of tobacco use, presenting hazards to health    Discussed risks of smoking and strongly advised smoking cessation.  Went over various options including patches, meds, and cessation programs available.    Hyperlipidemia LDL goal <130  The current medical regimen is effective.  Continue current medication regimen unchanged.  - Lipid panel reflex to direct LDL Fasting  - Comprehensive metabolic panel    HTN, goal below 140/90  HTN Plan:  1)  Medication: continue current medication regimen unchanged  2)  Dietary sodium restriction  3)  Regular aerobic exercise  4)  Recheck in 1 year, sooner should new symptoms or   problems arise.  5) See todays orders.    Patient Education: Reviewed risks of hypertension and principles of   treatment.    - Comprehensive metabolic panel    Screening for diabetes mellitus (DM)  - Comprehensive metabolic panel  - Hemoglobin A1c    Screening for prostate cancer  - Prostate Specific Antigen Screen    Screening for thyroid disorder  - TSH with free T4 reflex    Screening for disorder of blood and blood-forming organs  - CBC with platelets    Personal history of nicotine dependence  - CT Chest Lung Cancer Screen Low Dose Without    Encounter for screening for lung cancer  - CT Chest Lung Cancer Screen Low Dose Without    Skin tag left eye lid  Referral ordered follow up as indicated with specialist   - Adult Eye  Referral      Patient has been advised of split billing requirements and indicates understanding: Yes        Nicotine/Tobacco Cessation  He reports that he has been smoking cigarettes. He has a 25 pack-year smoking history. He has never used smokeless tobacco.  Nicotine/Tobacco Cessation Plan  Information offered: Patient not  "interested at this time      BMI  Estimated body mass index is 32.3 kg/m  as calculated from the following:    Height as of this encounter: 1.715 m (5' 7.5\").    Weight as of this encounter: 94.9 kg (209 lb 4.8 oz).   Weight management plan: Discussed healthy diet and exercise guidelines  Reviewed preventive health counseling, as reflected in patient instructions  Special attention given to:        Regular exercise       Healthy diet/nutrition       Consider prostate cancer screening (age 55-69)       Consider lung cancer screening for ages 55-80 years (77 for Medicare) and 20 pack-year smoking history   Counseling  Appropriate preventive services were addressed with this patient via screening, questionnaire, or discussion as appropriate for fall prevention, nutrition, physical activity, Tobacco-use cessation, social engagement, weight loss and cognition.  Checklist reviewing preventive services available has been given to the patient.  Reviewed patient's diet, addressing concerns and/or questions.   He is at risk for lack of exercise and has been provided with information to increase physical activity for the benefit of his well-being.   The patient reports drinking more than 3 alcoholic drinks per day and/or more than 7 drhnks per week. The patient was counseled and given information about possible harmful effects of excessive alcohol intake.      Follow-up    Follow-up Visit   Expected date:  Jul 07, 2026 (Approximate)      Follow Up Appointment Details:     Follow-up with whom?: PCP    Follow-Up for what?: Medicare Wellness    Welcome or Annual?: Annual Wellness    How?: In Person                 Caio Rutledge is a 69 year old, presenting for the following:  Wellness Visit        6/30/2025     2:15 PM   Additional Questions   Roomed by Adriano   Accompanied by Self          HPI     Advance Care Planning    Discussed advance care planning with patient; informed AVS has link to [facility].        " 6/30/2025   General Health   How would you rate your overall physical health? (!) FAIR   Feel stress (tense, anxious, or unable to sleep) Only a little   (!) STRESS CONCERN      6/30/2025   Nutrition   Diet: I don't know         6/30/2025   Exercise   Days per week of moderate/strenous exercise 1 day   Average minutes spent exercising at this level 20 min   (!) EXERCISE CONCERN      6/30/2025   Social Factors   Frequency of gathering with friends or relatives Once a week   Worry food won't last until get money to buy more No   Food not last or not have enough money for food? No   Do you have housing? (Housing is defined as stable permanent housing and does not include staying outside in a car, in a tent, in an abandoned building, in an overnight shelter, or couch-surfing.) Yes   Are you worried about losing your housing? No   Lack of transportation? No   Unable to get utilities (heat,electricity)? No         6/30/2025   Fall Risk   Fallen 2 or more times in the past year? No   Trouble with walking or balance? No          6/30/2025   Activities of Daily Living- Home Safety   Needs help with the following daily activites None of the above   Safety concerns in the home None of the above         6/30/2025   Dental   Dentist two times every year? Yes         6/30/2025   Hearing Screening   Hearing concerns? None of the above         6/30/2025   Driving Risk Screening   Patient/family members have concerns about driving No         6/30/2025   General Alertness/Fatigue Screening   Have you been more tired than usual lately? No         6/30/2025   Urinary Incontinence Screening   Bothered by leaking urine in past 6 months No         Today's PHQ-2 Score:       6/30/2025    11:54 AM   PHQ-2 ( 1999 [employer])   Q1: Little interest or pleasure in doing things 0   Q2: Feeling down, depressed or hopeless 0   PHQ-2 Score 0    Q1: Little interest or pleasure in doing things Not at all   Q2: Feeling down, depressed or hopeless Not at  all   PHQ-2 Score 0       Patient-reported           6/30/2025   Substance Use   Alcohol more than 3/day or more than 7/wk Yes   How often do you have a drink containing alcohol 2 to 3 times a week   How many alcohol drinks on typical day 1 or 2   How often do you have 5+ drinks at one occasion Less than monthly   Audit 2/3 Score 1   How often not able to stop drinking once started Never   How often failed to do what normally expected Never   How often needed first drink in am after a heavy drinking session Never   How often feeling of guilt or remorse after drinking Never   How often unable to remember what happened the night before Never   Have you or someone else been injured because of your drinking No   Has anyone been concerned or suggested you cut down on drinking No   TOTAL SCORE - AUDIT 4   Do you have a current opioid prescription? No   How severe/bad is pain from 1 to 10? 3/10   Do you use any other substances recreationally? (!) CANNABIS PRODUCTS     Social History     Tobacco Use    Smoking status: Every Day     Current packs/day: 1.00     Average packs/day: 1 pack/day for 25.0 years (25.0 ttl pk-yrs)     Types: Cigarettes    Smokeless tobacco: Never   Vaping Use    Vaping status: Never Used   Substance Use Topics    Alcohol use: Yes     Comment: 2-3 beers/day     Drug use: No       ASCVD Risk   The ASCVD Risk score (Alexander DK, et al., 2019) failed to calculate for the following reasons:    The valid total cholesterol range is 130 to 320 mg/dL          Reviewed and updated as needed this visit by Provider                    Past Medical History:   Diagnosis Date    Hypertension     NO ACTIVE PROBLEMS      Past Surgical History:   Procedure Laterality Date    COLONOSCOPY N/A 4/28/2023    Procedure: COLONOSCOPY, FLEXIBLE, WITH LESION REMOVAL USING SNARE;  Surgeon: Blair Stack MD;  Location: [facility] OR    COLONOSCOPY WITH CO2 INSUFFLATION N/A 7/30/2021    Procedure: COLONOSCOPY, WITH CO2  INSUFFLATION;  Surgeon: Albania Puentes DO;  Location: [facility] OR    COLONOSCOPY WITH CO2 INSUFFLATION N/A 4/28/2023    Procedure: Colonoscopy with CO2 insufflation;  Surgeon: Blair Stack MD;  Location: [facility] OR    HERNIA REPAIR  1974    age 17--right    LAPAROTOMY EXPLORATORY  1998    RLQ abcess    ROTATOR CUFF REPAIR RT/LT  2002    left      Lab work is in process  Labs reviewed in EPIC  BP Readings from Last 3 Encounters:   06/30/25 115/68   05/23/24 123/77   04/16/24 138/79    Wt Readings from Last 3 Encounters:   06/30/25 94.9 kg (209 lb 4.8 oz)   05/23/24 95.8 kg (211 lb 3.2 oz)   04/16/24 96.6 kg (213 lb)                  Patient Active Problem List   Diagnosis    Hyperlipidemia LDL goal <130    Obesity (BMI 30-39.9)    Tobacco use disorder    Elevated fasting glucose    Erectile dysfunction, unspecified erectile dysfunction type    Vitamin D deficiency    Muscle cramp, nocturnal    WILL (obstructive sleep apnea)- mild (AHI 7)    Ectatic abdominal aorta     Past Surgical History:   Procedure Laterality Date    COLONOSCOPY N/A 4/28/2023    Procedure: COLONOSCOPY, FLEXIBLE, WITH LESION REMOVAL USING SNARE;  Surgeon: Blair Stack MD;  Location: [facility]    COLONOSCOPY WITH CO2 INSUFFLATION N/A 7/30/2021    Procedure: COLONOSCOPY, WITH CO2 INSUFFLATION;  Surgeon: Albania Puentes DO;  Location: [facility] OR    COLONOSCOPY WITH CO2 INSUFFLATION N/A 4/28/2023    Procedure: Colonoscopy with CO2 insufflation;  Surgeon: Blair Stack MD;  Location: [facility] OR    HERNIA REPAIR  1974    age 17--right    LAPAROTOMY EXPLORATORY  1998    RLQ abcess    ROTATOR CUFF REPAIR RT/LT  2002    left        Social History     Tobacco Use    Smoking status: Every Day     Current packs/day: 1.00     Average packs/day: 1 pack/day for 25.0 years (25.0 ttl pk-yrs)     Types: Cigarettes    Smokeless tobacco: Never   Substance Use Topics    Alcohol use: Yes     Comment: 2-3 beers/day      Family History   Problem Relation Age of  Onset    Unknown/Adopted Daughter     Hypertension Mother     Eye Disorder Mother         cataracts, glucoma     Diabetes No family hx of     Coronary Artery Disease No family hx of     Colon Cancer No family hx of          Current Outpatient Medications   Medication Sig Dispense Refill    amLODIPine (NORVASC) 10 MG tablet Take 1 tablet (10 mg) by mouth daily. 90 tablet 3    IBUPROFEN PO Take by mouth as needed for moderate pain      lisinopril (ZESTRIL) 40 MG tablet Take 1 tablet (40 mg) by mouth daily. 90 tablet 3    rosuvastatin (CRESTOR) 40 MG tablet Take 1 tablet (40 mg) by mouth daily. 90 tablet 3     No Known Allergies  Current providers sharing in care for this patient include:  Patient Care Team:  Izabela Moulton APRN CNP as PCP - General (Family Medicine)  Izabela Moulton APRN CNP as Assigned PCP  Talha Groves MD as Assigned Heart and Vascular Provider    The following health maintenance items are reviewed in Epic and correct as of today:  Health Maintenance   Topic Date Due    ZOSTER VACCINE (1 of 2) Never done    COVID-19 VACCINE (7 - 2024-25 season) 03/12/2025    NICOTINE/TOBACCO CESSATION COUNSELING Q 1 YR  04/16/2025    ANNUAL REVIEW OF HM ORDERS  04/16/2025    LUNG CANCER SCREENING  04/29/2025    BMP  09/10/2025    LIPID  09/10/2025    MEDICARE ANNUAL WELLNESS VISIT  06/30/2026    FALL RISK ASSESSMENT  06/30/2026    DIABETES SCREENING  09/10/2027    COLORECTAL CANCER SCREENING  04/28/2028    ADVANCE CARE PLANNING  04/16/2029    DTAP/TDAP/TD VACCINE (2 - Td or Tdap) 04/27/2029    HEPATITIS C SCREENING  Completed    PHQ-2 (once per calendar year)  Completed    INFLUENZA VACCINE  Completed    PNEUMOCOCCAL VACCINE 50+ YEARS  Completed    RSV VACCINE  Completed    AORTIC ANEURYSM SCREENING (SYSTEM ASSIGNED)  Completed    HPV VACCINE  Aged Out    MENINGITIS VACCINE  Aged Out       ROS:  CONSTITUTIONAL:NEGATIVE for fever, chills, change in weight  INTEGUMENTARY/SKIN: NEGATIVE for  "worrisome rashes, moles or lesions  EYES: POSITIVE for eye lid tag  and NEGATIVE for diplopia and eye pain left  ENT: NEGATIVE for ear, mouth and throat problems  RESP:NEGATIVE for significant cough or SOB  CV: NEGATIVE for chest pain, palpitations or peripheral edema  GI: NEGATIVE for nausea, abdominal pain, heartburn, or change in bowel habits   male: negative for dysuria, hematuria, decreased urinary stream, erectile dysfunction, urethral discharge  MUSCULOSKELETAL:NEGATIVE for significant arthralgias or myalgia  NEURO: NEGATIVE for weakness, dizziness or paresthesias  ENDOCRINE: NEGATIVE for temperature intolerance, skin/hair changes  HEME/ALLERGY/IMMUNE: NEGATIVE for bleeding problems  PSYCHIATRIC: NEGATIVE for changes in mood or affect       Objective    Exam  /68 (BP Location: Right arm, Patient Position: Sitting, Cuff Size: Adult Regular)   Pulse 71   Temp 97.4  F (36.3  C) (Tympanic)   Resp 18   Ht 1.715 m (5' 7.5\")   Wt 94.9 kg (209 lb 4.8 oz)   SpO2 94%   BMI 32.30 kg/m     Estimated body mass index is 32.3 kg/m  as calculated from the following:    Height as of this encounter: 1.715 m (5' 7.5\").    Weight as of this encounter: 94.9 kg (209 lb 4.8 oz).    Physical Exam  GENERAL: alert, no distress, and obese  EYES: Eyes grossly normal to inspection, eyelids- skin tag left upper lid , and conjunctiva/corneas- normal   HENT: ear canals and TM's normal, nose and mouth without ulcers or lesions  NECK: no adenopathy, no asymmetry, masses, or scars  RESP: lungs clear to auscultation - no rales, rhonchi or wheezes  CV: regular rates and rhythm, no murmur, click or rub, peripheral pulses strong, no peripheral edema, and color normal  ABDOMEN: soft, nontender, no hepatosplenomegaly, no masses and bowel sounds normal   (male): no hernias  MS: no gross musculoskeletal defects noted, no edema  SKIN: no suspicious lesions or rashes  NEURO: Normal strength and tone, mentation intact and speech " normal  PSYCH: mentation appears normal, affect normal/bright  LYMPH: no cervical, supraclavicular, axillary, or inguinal adenopathy         6/30/2025   Mini Cog   Clock Draw Score 2 Normal   3 Item Recall 3 objects recalled   Mini Cog Total Score 5     Diagnostic Test Results:   Diagnostic Test Results:  Labs reviewed in Epic  Pending orders and results          Signed Electronically by: HAVEN Morgan CNP

## 2025-06-30 NOTE — PROGRESS NOTES
Prior to immunization administration, verified patients identity using patient s name and date of birth. Please see Immunization Activity for additional information.     Screening Questionnaire for Adult Immunization    Are you sick today?   No   Do you have allergies to medications, food, a vaccine component or latex?   No   Have you ever had a serious reaction after receiving a vaccination?   No   Do you have a long-term health problem with heart, lung, kidney, or metabolic disease (e.g., diabetes), asthma, a blood disorder, no spleen, complement component deficiency, a cochlear implant, or a spinal fluid leak?  Are you on long-term aspirin therapy?   No   Do you have cancer, leukemia, HIV/AIDS, or any other immune system problem?   No   Do you have a parent, brother, or sister with an immune system problem?   No   In the past 3 months, have you taken medications that affect  your immune system, such as prednisone, other steroids, or anticancer drugs; drugs for the treatment of rheumatoid arthritis, Crohn s disease, or psoriasis; or have you had radiation treatments?   No   Have you had a seizure, or a brain or other nervous system problem?   No   During the past year, have you received a transfusion of blood or blood    products, or been given immune (gamma) globulin or antiviral drug?   No   For women: Are you pregnant or is there a chance you could become       pregnant during the next month?   No   Have you received any vaccinations in the past 4 weeks?   No     Immunization questionnaire answers were all negative.      Patient instructed to remain in clinic for 15 minutes afterwards, and to report any adverse reactions.     Screening performed by Elizabet Alonzo on 6/30/2025 at 3:25 PM.

## 2025-07-01 ENCOUNTER — PATIENT OUTREACH (OUTPATIENT)
Dept: CARE COORDINATION | Facility: CLINIC | Age: 70
End: 2025-07-01
Payer: MEDICARE

## 2025-07-03 ENCOUNTER — PATIENT OUTREACH (OUTPATIENT)
Dept: CARE COORDINATION | Facility: CLINIC | Age: 70
End: 2025-07-03
Payer: MEDICARE

## 2025-09-03 ENCOUNTER — OFFICE VISIT (OUTPATIENT)
Dept: OPHTHALMOLOGY | Facility: CLINIC | Age: 70
End: 2025-09-03
Payer: MEDICARE

## 2025-09-03 DIAGNOSIS — H02.9 LESION OF LEFT UPPER EYELID: ICD-10-CM

## 2025-09-03 RX ORDER — ERYTHROMYCIN 5 MG/G
OINTMENT OPHTHALMIC ONCE
Status: COMPLETED | OUTPATIENT
Start: 2025-09-03 | End: 2025-09-03

## 2025-09-03 RX ADMIN — ERYTHROMYCIN: 5 OINTMENT OPHTHALMIC at 15:22

## 2025-09-03 ASSESSMENT — EXTERNAL EXAM - LEFT EYE: OS_EXAM: NORMAL

## 2025-09-03 ASSESSMENT — VISUAL ACUITY
OS_CC: 20/50
OD_CC+: -2
METHOD: SNELLEN - LINEAR
CORRECTION_TYPE: GLASSES
OD_CC: 20/20

## 2025-09-03 ASSESSMENT — TONOMETRY
OD_IOP_MMHG: 15
OS_IOP_MMHG: 13
IOP_METHOD: ICARE

## 2025-09-03 ASSESSMENT — EXTERNAL EXAM - RIGHT EYE: OD_EXAM: NORMAL

## (undated) DEVICE — KIT ENDO FIRST STEP DISINFECTANT 200ML W/POUCH EP-4

## (undated) DEVICE — PREP CHLORAPREP 26ML TINTED ORANGE  260815

## (undated) DEVICE — PAD CHUX UNDERPAD 23X24" 7136

## (undated) DEVICE — GLOVE BIOGEL PI ULTRATOUCH G SZ 7.5 42175

## (undated) DEVICE — GLOVE BIOGEL PI MICRO INDICATOR UNDERGLOVE SZ 7.5 48975

## (undated) DEVICE — SOL WATER IRRIG 1000ML BOTTLE 07139-09

## (undated) RX ORDER — FENTANYL CITRATE 50 UG/ML
INJECTION, SOLUTION INTRAMUSCULAR; INTRAVENOUS
Status: DISPENSED
Start: 2021-07-30

## (undated) RX ORDER — FENTANYL CITRATE 50 UG/ML
INJECTION, SOLUTION INTRAMUSCULAR; INTRAVENOUS
Status: DISPENSED
Start: 2023-04-28